# Patient Record
Sex: FEMALE | Race: BLACK OR AFRICAN AMERICAN | Employment: UNEMPLOYED | URBAN - METROPOLITAN AREA
[De-identification: names, ages, dates, MRNs, and addresses within clinical notes are randomized per-mention and may not be internally consistent; named-entity substitution may affect disease eponyms.]

---

## 2018-02-09 LAB
CHLAMYDIA, EXTERNAL: NEGATIVE
HBSAG, EXTERNAL: NEGATIVE
HEPATITIS C AB,   EXT: NEGATIVE
HIV, EXTERNAL: NEGATIVE
N. GONORRHEA, EXTERNAL: NEGATIVE
RPR, EXTERNAL: NON REACTIVE
RUBELLA, EXTERNAL: NORMAL

## 2018-03-07 ENCOUNTER — HOSPITAL ENCOUNTER (EMERGENCY)
Age: 23
Discharge: HOME | End: 2018-03-07
Attending: EMERGENCY MEDICINE
Payer: MEDICAID

## 2018-03-07 VITALS
OXYGEN SATURATION: 100 % | SYSTOLIC BLOOD PRESSURE: 108 MMHG | DIASTOLIC BLOOD PRESSURE: 70 MMHG | BODY MASS INDEX: 19.32 KG/M2 | WEIGHT: 105 LBS | TEMPERATURE: 98.5 F | RESPIRATION RATE: 16 BRPM | HEIGHT: 62 IN | HEART RATE: 68 BPM

## 2018-03-07 DIAGNOSIS — O20.0 THREATENED ABORTION: Primary | ICD-10-CM

## 2018-03-07 DIAGNOSIS — A56.09 CHLAMYDIA VAGINITIS/CERVICITIS: ICD-10-CM

## 2018-03-07 DIAGNOSIS — A56.02 CHLAMYDIA VAGINITIS/CERVICITIS: ICD-10-CM

## 2018-03-07 LAB
APPEARANCE UR: CLEAR
BACTERIA URNS QL MICRO: ABNORMAL /HPF
BASOPHILS # BLD: 0 K/UL (ref 0–0.06)
BASOPHILS NFR BLD: 0 % (ref 0–2)
BILIRUB UR QL: NEGATIVE
CAOX CRY URNS QL MICRO: ABNORMAL
COLOR UR: YELLOW
DIFFERENTIAL METHOD BLD: ABNORMAL
EOSINOPHIL # BLD: 0 K/UL (ref 0–0.4)
EOSINOPHIL NFR BLD: 0 % (ref 0–5)
EPITH CASTS URNS QL MICRO: ABNORMAL /LPF (ref 0–5)
ERYTHROCYTE [DISTWIDTH] IN BLOOD BY AUTOMATED COUNT: 15.3 % (ref 11.6–14.5)
GLUCOSE UR STRIP.AUTO-MCNC: NEGATIVE MG/DL
HCG SERPL-ACNC: ABNORMAL MIU/ML (ref 1–6)
HCT VFR BLD AUTO: 33.9 % (ref 35–45)
HGB BLD-MCNC: 10.7 G/DL (ref 12–16)
HGB UR QL STRIP: ABNORMAL
KETONES UR QL STRIP.AUTO: ABNORMAL MG/DL
LEUKOCYTE ESTERASE UR QL STRIP.AUTO: NEGATIVE
LYMPHOCYTES # BLD: 1.5 K/UL (ref 0.9–3.6)
LYMPHOCYTES NFR BLD: 21 % (ref 21–52)
MCH RBC QN AUTO: 23 PG (ref 24–34)
MCHC RBC AUTO-ENTMCNC: 31.6 G/DL (ref 31–37)
MCV RBC AUTO: 72.9 FL (ref 74–97)
MONOCYTES # BLD: 0.5 K/UL (ref 0.05–1.2)
MONOCYTES NFR BLD: 7 % (ref 3–10)
MUCOUS THREADS URNS QL MICRO: ABNORMAL /LPF
NEUTS SEG # BLD: 5.2 K/UL (ref 1.8–8)
NEUTS SEG NFR BLD: 72 % (ref 40–73)
NITRITE UR QL STRIP.AUTO: NEGATIVE
PH UR STRIP: 6.5 [PH] (ref 5–8)
PLATELET # BLD AUTO: 227 K/UL (ref 135–420)
PMV BLD AUTO: 10.2 FL (ref 9.2–11.8)
PROT UR STRIP-MCNC: NEGATIVE MG/DL
RBC # BLD AUTO: 4.65 M/UL (ref 4.2–5.3)
RBC #/AREA URNS HPF: ABNORMAL /HPF (ref 0–5)
SP GR UR REFRACTOMETRY: >1.03 (ref 1–1.03)
UROBILINOGEN UR QL STRIP.AUTO: 1 EU/DL (ref 0.2–1)
WBC # BLD AUTO: 7.2 K/UL (ref 4.6–13.2)
WBC URNS QL MICRO: NEGATIVE /HPF (ref 0–5)

## 2018-03-07 PROCEDURE — 85025 COMPLETE CBC W/AUTO DIFF WBC: CPT | Performed by: EMERGENCY MEDICINE

## 2018-03-07 PROCEDURE — 81001 URINALYSIS AUTO W/SCOPE: CPT | Performed by: EMERGENCY MEDICINE

## 2018-03-07 PROCEDURE — 74011250637 HC RX REV CODE- 250/637: Performed by: EMERGENCY MEDICINE

## 2018-03-07 PROCEDURE — 84702 CHORIONIC GONADOTROPIN TEST: CPT | Performed by: EMERGENCY MEDICINE

## 2018-03-07 PROCEDURE — 99284 EMERGENCY DEPT VISIT MOD MDM: CPT

## 2018-03-07 RX ORDER — AZITHROMYCIN 250 MG/1
1000 TABLET, FILM COATED ORAL
Status: COMPLETED | OUTPATIENT
Start: 2018-03-07 | End: 2018-03-07

## 2018-03-07 RX ORDER — ACETAMINOPHEN 500 MG
1000 TABLET ORAL
Status: COMPLETED | OUTPATIENT
Start: 2018-03-07 | End: 2018-03-07

## 2018-03-07 RX ADMIN — AZITHROMYCIN 1000 MG: 250 TABLET, FILM COATED ORAL at 20:24

## 2018-03-07 RX ADMIN — ACETAMINOPHEN 1000 MG: 500 TABLET ORAL at 20:24

## 2018-03-08 NOTE — DISCHARGE INSTRUCTIONS
Threatened Miscarriage: Care Instructions  Your Care Instructions    Some women have light spotting or bleeding during the first 12 weeks of pregnancy. In some cases this is normal. Light spotting or bleeding can also be a sign of a possible loss of the pregnancy. This is called a threatened miscarriage. At this point, the doctor may not be able to tell if your vaginal bleeding is normal or is a sign of a miscarriage. In early pregnancy, things such as stress, exercise, and sex do not cause miscarriage. You may be worried or upset about the possibility of losing your pregnancy. But do not blame yourself. There is no treatment to stop a threatened miscarriage. If you do have a miscarriage, there was nothing you could have done to prevent it. A miscarriage usually means that the pregnancy is not developing normally. The doctor has checked you carefully, but problems can develop later. If you notice any problems or new symptoms, get medical treatment right away. Follow-up care is a key part of your treatment and safety. Be sure to make and go to all appointments, and call your doctor if you are having problems. It's also a good idea to know your test results and keep a list of the medicines you take. How can you care for yourself at home? · If you do have a miscarriage, you will probably have some vaginal bleeding for 1 to 2 weeks. Use pads instead of tampons. · Take acetaminophen (Tylenol) for cramps. Read and follow all instructions on the label. · Do not take two or more pain medicines at the same time unless the doctor told you to. Many pain medicines have acetaminophen, which is Tylenol. Too much acetaminophen (Tylenol) can be harmful. · Do not have sex until your doctor says it is okay. · Get lots of rest over the next several days. · You may do your normal activities if you feel well enough to do them. But do not do any heavy exercise until your doctor says it is okay.   · Eat a balanced diet that is high in iron and vitamin C. Foods rich in iron include red meat, shellfish, eggs, beans, and leafy green vegetables. Foods high in vitamin C include citrus fruits, tomatoes, and broccoli. Talk to your doctor about whether you need to take iron pills or a multivitamin. · Do not drink alcohol or use tobacco or illegal drugs. · Do not smoke. If you need help quitting, talk to your doctor about stop-smoking programs and medicines. These can increase your chances of quitting for good. When should you call for help? Call 911 anytime you think you may need emergency care. For example, call if:  ? · You passed out (lost consciousness). ?Call your doctor now or seek immediate medical care if:  ? · You have severe vaginal bleeding. ? · You are dizzy or lightheaded, or you feel like you may faint. ? · You have new or worse pain in your belly or pelvis. ? · You have a fever. ? · You have vaginal discharge that smells bad. ? Watch closely for changes in your health, and be sure to contact your doctor if:  ? · You do not get better as expected. Where can you learn more? Go to http://yonis-joseline.info/. Enter A986 in the search box to learn more about \"Threatened Miscarriage: Care Instructions. \"  Current as of: March 16, 2017  Content Version: 11.4  © 1210-0877 [employer]. Care instructions adapted under license by [employer] (which disclaims liability or warranty for this information). If you have questions about a medical condition or this instruction, always ask your healthcare professional. [place] [address] any warranty or liability for your use of this information. Chlamydia: Care Instructions  Your Care Instructions  Chlamydia is a bacterial infection spread through sexual contact. It is one of the most common sexually transmitted infections (STIs).  Most people who get chlamydia do not have symptoms, but they can still infect their sex partners. If chlamydia in women is not treated, it can cause pelvic inflammatory disease (PID), a severe pelvic infection. PID can make it hard for a woman to get pregnant. Antibiotics can cure chlamydia. Both sex partners need treatment to keep from passing the infection back and forth. Certain antibiotics should not be used in pregnancy. If you were not tested for pregnancy during this visit, tell your doctor if you might be pregnant. Follow-up care is a key part of your treatment and safety. Be sure to make and go to all appointments, and call your doctor if you are having problems. It's also a good idea to know your test results and keep a list of the medicines you take. How can you care for yourself at home? · Chlamydia often is treated with a single dose of antibiotics in the doctor's office. If your doctor prescribed antibiotics to take at home, take them as directed. Do not stop taking them just because you feel better. You need to take the full course of antibiotics. · Do not have sex with anyone while you are being treated. If your treatment is a single dose of antibiotics, wait at least 7 days after you take the dose before you have sex. Even if you use a condom, you and your partner may pass the infection back and forth. · Make sure to tell your sex partner or partners that you have chlamydia. They should get treated, even if they do not have symptoms. · Your doctor may have done tests for other STIs. If so, call back in 3 or 4 days for those results. · Your doctor may advise you to be tested again for chlamydia in 3 or 4 months. How can you prevent chlamydia and other STIs in the future? · Use latex condoms every time you have sex. Use them from the beginning to the end of sexual contact. · Talk to your partner before you have sex. Find out if he or she has or is at risk for chlamydia or any other STI.  Keep in mind that a person may be able to spread an STI even if he or she does not have symptoms. · Do not have sex while you are being treated for chlamydia or any other STI. · Do not have sex with anyone who has symptoms of an STI, such as sores on the genitals or mouth. · Having one sex partner (who does not have STIs and does not have sex with anyone else) is a good way to avoid STIs. When should you call for help? Call 911 anytime you think you may need emergency care. For example, call if:  ? · You have sudden, severe pain in your belly or pelvis. ?Call your doctor now or seek immediate medical care if:  ? · You have new belly or pelvic pain. ? · You have a fever. ? · You have new or increased burning or pain with urination, or you cannot urinate. ? · You have pain, swelling, or tenderness in the scrotum. ? Watch closely for changes in your health, and be sure to contact your doctor if:  ? · You have unusual vaginal bleeding. ? · You have a discharge from the vagina or penis. ? · You think you may have been exposed to another STI. ? · Your symptoms get worse or have not improved within 1 week after starting treatment. ? · You have any new symptoms, such as sores, bumps, rashes, blisters, or warts in the genital or anal area. Where can you learn more? Go to http://yonis-joseline.info/. Enter P455 in the search box to learn more about \"Chlamydia: Care Instructions. \"  Current as of: March 20, 2017  Content Version: 11.4  © 8791-5047 [employer]. Care instructions adapted under license by [employer] (which disclaims liability or warranty for this information). If you have questions about a medical condition or this instruction, always ask your healthcare professional. [address] any warranty or liability for your use of this information.

## 2018-03-08 NOTE — ED PROVIDER NOTES
[address]  EMERGENCY DEPARTMENT HISTORY AND PHYSICAL EXAM    Date: 3/7/2018  Patient Name: Sherrell Meier  YOB: 1995  Medical Record Number: [id number]     History of Presenting Illness     Chief Complaint   Patient presents with    Abdominal Pain       History Provided By: Patient    Chief Complaint: Abdominal pain  Duration: 2 Days  Timing:  Constant  Location: Left sided abdomen  Quality: Cramping  Severity: 6 out of 10  Modifying Factors: No relieving or worsening factors  Associated Symptoms: Vaginal bleeding, back pain, and nausea    Additional History (Context):   8:02 PM  Sherrell Meier is a 23 y.o. female who is ~8 weeks pregnant, Eugenia Vasquez was 20, who presents to the emergency department via EMS C/O 6/10 left sided abdominal pain onset 2 days ago. Associated symptoms include vaginal bleeding, back pain, and nausea. Has not taken any medications for pain. Pt was seen by [facility] 2 weeks ago for abdominal pain with N/V, had US done and pelvic exam, and was dx with pregnancy at ~6 weeks. Not currently taking prenatals. Pt is A1, had  at 2 weeks 2 years ago. PMHx of chlamydia 1 year ago for which pt was tx'd, when retested 3 months ago, pt was negative for chlamydia. Pt denies vomiting, fever, chills, and any other Sx or complaints. Shx: -tobacco use, -EtOH use, -illicit drug use    PCP: Farzaneh Gilbert MD     Current Outpatient Prescriptions   Medication Sig Dispense Refill    PRENATAL VIT CALC,IRON,FOLIC (PRENATAL VITAMIN PO) Take  by mouth. Past History     Past Medical History:  Past Medical History:   Diagnosis Date         Chlamydia     Kidney infection        Past Surgical History:  No past surgical history on file.     Family History:  Family History   Problem Relation Age of Onset    Lupus Mother     Cancer Neg Hx        Social History:  Social History   Substance Use Topics    Smoking status: Never Smoker    Smokeless tobacco: Never Used    Alcohol use No       Allergies:  No Known Allergies      Review of Systems     Review of Systems   Constitutional: Negative for chills and fever. Gastrointestinal: Positive for abdominal pain and nausea. Negative for vomiting. Genitourinary: Positive for vaginal bleeding. Musculoskeletal: Positive for back pain. All other systems reviewed and are negative. Physical Exam     Vitals:    03/07/18 1926   BP: 133/63   Pulse: (!) 102   Resp: 16   Temp: 98.5 °F (36.9 °C)   SpO2: 100%   Weight: 47.6 kg (105 lb)   Height: 5' 2\" (1.575 m)     Physical Exam   Constitutional: She is oriented to person, place, and time. She appears well-developed and well-nourished. HENT:   Head: Normocephalic and atraumatic. Eyes: Pupils are equal, round, and reactive to light. Neck: Neck supple. Cardiovascular: Normal rate, regular rhythm, S1 normal, S2 normal and normal heart sounds. Pulmonary/Chest: Breath sounds normal. No respiratory distress. She has no wheezes. She has no rales. She exhibits no tenderness. Abdominal: Soft. She exhibits no distension and no mass. There is tenderness. There is no rebound and no guarding. Mild diffuse lower abdominal tenderness   Genitourinary: Rectal exam shows no mass. Cervix exhibits no discharge. Right adnexum displays no tenderness. Left adnexum displays no tenderness. No bleeding in the vagina. No vaginal discharge found. Genitourinary Comments: Small brownish discharge on fingertips during pelvic exam, but no discharge from cervix or in vagina   Musculoskeletal: Normal range of motion. She exhibits no edema or tenderness. Neurological: She is alert and oriented to person, place, and time. No cranial nerve deficit. Skin: No rash noted. Psychiatric: She has a normal mood and affect. Her behavior is normal. Thought content normal.   Nursing note and vitals reviewed.       Diagnostic Study Results     Labs -     Recent Results (from the past 12 hour(s))   CBC WITH AUTOMATED DIFF    Collection Time: 03/07/18  8:30 PM   Result Value Ref Range    WBC 7.2 4.6 - 13.2 K/uL    RBC 4.65 4.20 - 5.30 M/uL    HGB 10.7 (L) 12.0 - 16.0 g/dL    HCT 33.9 (L) 35.0 - 45.0 %    MCV 72.9 (L) 74.0 - 97.0 FL    MCH 23.0 (L) 24.0 - 34.0 PG    MCHC 31.6 31.0 - 37.0 g/dL    RDW 15.3 (H) 11.6 - 14.5 %    PLATELET 165 160 - 101 K/uL    MPV 10.2 9.2 - 11.8 FL    NEUTROPHILS 72 40 - 73 %    LYMPHOCYTES 21 21 - 52 %    MONOCYTES 7 3 - 10 %    EOSINOPHILS 0 0 - 5 %    BASOPHILS 0 0 - 2 %    ABS. NEUTROPHILS 5.2 1.8 - 8.0 K/UL    ABS. LYMPHOCYTES 1.5 0.9 - 3.6 K/UL    ABS. MONOCYTES 0.5 0.05 - 1.2 K/UL    ABS. EOSINOPHILS 0.0 0.0 - 0.4 K/UL    ABS. BASOPHILS 0.0 0.0 - 0.06 K/UL    DF AUTOMATED     BETA HCG, QT    Collection Time: 03/07/18  8:30 PM   Result Value Ref Range    Beta HCG, QT 804607 (H) 1.0 - 6.0 MIU/ML   URINALYSIS W/ RFLX MICROSCOPIC    Collection Time: 03/07/18  8:38 PM   Result Value Ref Range    Color YELLOW      Appearance CLEAR      Specific gravity >1.030 (H) 1.005 - 1.030    pH (UA) 6.5 5.0 - 8.0      Protein NEGATIVE  NEG mg/dL    Glucose NEGATIVE  NEG mg/dL    Ketone TRACE (A) NEG mg/dL    Bilirubin NEGATIVE  NEG      Blood TRACE (A) NEG      Urobilinogen 1.0 0.2 - 1.0 EU/dL    Nitrites NEGATIVE  NEG      Leukocyte Esterase NEGATIVE  NEG     URINE MICROSCOPIC ONLY    Collection Time: 03/07/18  8:38 PM   Result Value Ref Range    WBC NEGATIVE  0 - 5 /hpf    RBC 0 to 1 0 - 5 /hpf    Epithelial cells 1+ 0 - 5 /lpf    Bacteria 1+ (A) NEG /hpf    Mucus 1+ (A) NEG /lpf    CA Oxalate crystals FEW (A) NEG         Radiologic Studies -   No orders to display       Medications Given in the ED:  Medications   acetaminophen (TYLENOL) tablet 1,000 mg (1,000 mg Oral Given 3/7/18 2024)   azithromycin (ZITHROMAX) tablet 1,000 mg (1,000 mg Oral Given 3/7/18 2024)        Medical Decision Making     I am the first provider for this patient.     I reviewed the vital signs, available nursing notes, past medical history, past surgical history, family history and social history. Records Reviewed: Nursing Notes, Old Medical Records and Previous Radiology Studies   Pt was [facility] by [facility] on [facility]/21/18. Had quantitative of 45,432. Had US that showed yolk sac at ~6-7 weeks with no fetal pole and no heartbeat. She is Rh+. She was also positive for chlamydia. Vital Signs-Reviewed the patient's vital signs. Patient Vitals for the past 12 hrs:   Temp Pulse Resp BP SpO2   03/07/18 1926 98.5 °F (36.9 °C) (!) 102 16 133/63 100 %       Provider Notes (Medical Decision Making):   INITIAL CLINICAL IMPRESSION and PLANS:  The patient presents with the primary complaint(s) of: abdomen pain. The presentation, to include historical aspects and clinical findings are consistent with the DX of early pregnancy with vaginal bleeding. However, other possible DX's to consider as primary, associated with, or exacerbated by include:    1.  Ectopic  2. Threatened AB  3.  AB  4. UTI  5. Kidney stone  6. Ovarian dz  7. PID    Pulse Oximetry Analysis - Normal 100% on RA        Procedures:  Pelvic Exam  Date/Time: 3/7/2018 9:42 PM  Performed by: attending  Procedure duration:  15 minutes. Documented by:  Jane Arevalo, ED Scribe. As dictated by:  Zamzam Richey MD  Exam assisted by:  Female RN. Type of exam performed: bimanual and speculum. External genitalia appearance: normal.    Vaginal exam:  normal.    Cervical exam:  os closed (small brownish discharge on fingertips, but no discharge from cervix or in vagina). Specimen(s) collected:  chlamydia, GC and vaginal culture. Bimanual exam:  normal.    Patient tolerance: Patient tolerated the procedure well with no immediate complications  Comments: Uterus of 8-10 week size         ED Course:   8:02 PM Initial assessment performed. 9:40 PM Pt and/or family have been updated on their results.  Pt and/or pt's family are aware of the plan of care and are in agreement. Diagnosis and Disposition     Discussion:  23 y.o. female with c/o abdominal pain and vaginal bleeding. Reviewed [facility]. I suspect she most likely has chlamydia vaginitis and threatened AB. Her quantitative has almost quadrupled. She may need a repeat US as an outpatient to check for fetal heart beat. I tx'd her for her chlamydia and advised for her partner to be tx'd. She has OB already, but cannot recall who. Will refer her to [facility] doctor on call. Discharge Note:  9:48 PM  Coni Garg  results have been reviewed with her. She has been counseled regarding her diagnosis, treatment, and plan. She verbally conveys understanding and agreement of the signs, symptoms, diagnosis, treatment and prognosis and additionally agrees to follow up as discussed. She also agrees with the care-plan and conveys that all of her questions have been answered. I have also provided discharge instructions for her that include: educational information regarding their diagnosis and treatment, and list of reasons why they would want to return to the ED prior to their follow-up appointment, should her condition change. She has been provided with education for proper emergency department utilization. Clinical Impression:    1. Threatened     2. Chlamydia vaginitis/cervicitis        PLAN:  1. D/C Home  2. Current Discharge Medication List        3. Follow-up Information     Follow up With Details Comments Abran Hurt MD Schedule an appointment as soon as possible for a visit For follow up with [facility] [place] [address]      [facility] EMERGENCY DEPT Go to As needed, if symptoms worsen [address] [id number]  [phone]        _______________________________    Attestations:   This note is prepared by Anitra Louise, acting as Scribe for Ketty Yepez MD.    Ketty Yepez MD:  The scribe's documentation has been prepared under my direction and personally reviewed by me in its entirety.   I confirm that the note above accurately reflects all work, treatment, procedures, and medical decision making performed by me.  _______________________________

## 2018-03-08 NOTE — ED TRIAGE NOTES
C/o abd and back pain, nausea and bloody discharge with clots for 2 days. Pt states she is preg. No prenatal care. Sepsis Screening completed    (  )Patient meets SIRS criteria. ( x )Patient does not meet SIRS criteria.       SIRS Criteria is achieved when two or more of the following are present   Temperature < 96.8°F (36°C) or > 100.9°F (38.3°C)   Heart Rate > 90 beats per minute   Respiratory Rate > 20 breaths per minute   WBC count > 12,000 or <4,000 or > 10% bands

## 2018-03-12 NOTE — CALL BACK NOTE
Patient called and wanted clarification of her recent visit. Review of records shows patient was seen for vaginal bleeding and ultrasound done previously did not see a heartbeat and she was to have a re-pear ultrasound to check viability.  Patient has OB appt this Friday and is offering no questions or concerns at this time

## 2018-08-20 PROBLEM — A74.9 CHLAMYDIA INFECTION AFFECTING PREGNANCY IN FIRST TRIMESTER: Status: ACTIVE | Noted: 2018-08-20

## 2018-08-20 PROBLEM — O09.30 LATE PRENATAL CARE: Status: ACTIVE | Noted: 2018-08-20

## 2018-08-20 PROBLEM — O98.811 CHLAMYDIA INFECTION AFFECTING PREGNANCY IN FIRST TRIMESTER: Status: ACTIVE | Noted: 2018-08-20

## 2018-09-07 ENCOUNTER — HOSPITAL ENCOUNTER (EMERGENCY)
Age: 23
Discharge: HOME | DRG: 560 | End: 2018-09-08
Attending: OBSTETRICS & GYNECOLOGY | Admitting: OBSTETRICS & GYNECOLOGY
Payer: MEDICAID

## 2018-09-07 PROBLEM — O36.5931 IUGR (INTRAUTERINE GROWTH RESTRICTION) AFFECTING CARE OF MOTHER, THIRD TRIMESTER, FETUS 1: Status: ACTIVE | Noted: 2018-09-07

## 2018-09-07 PROBLEM — Z3A.35 35 WEEKS GESTATION OF PREGNANCY: Status: ACTIVE | Noted: 2018-09-07

## 2018-09-07 LAB
ABO + RH BLD: NORMAL
BLOOD GROUP ANTIBODIES SERPL: NORMAL
ERYTHROCYTE [DISTWIDTH] IN BLOOD BY AUTOMATED COUNT: 16.3 % (ref 11.6–14.5)
HCT VFR BLD AUTO: 29.4 % (ref 35–45)
HGB BLD-MCNC: 9.2 G/DL (ref 12–16)
MCH RBC QN AUTO: 22.9 PG (ref 24–34)
MCHC RBC AUTO-ENTMCNC: 31.3 G/DL (ref 31–37)
MCV RBC AUTO: 73.3 FL (ref 74–97)
PLATELET # BLD AUTO: 204 K/UL (ref 135–420)
PMV BLD AUTO: 10.2 FL (ref 9.2–11.8)
RBC # BLD AUTO: 4.01 M/UL (ref 4.2–5.3)
SPECIMEN EXP DATE BLD: NORMAL
WBC # BLD AUTO: 9.3 K/UL (ref 4.6–13.2)

## 2018-09-07 PROCEDURE — 85027 COMPLETE CBC AUTOMATED: CPT | Performed by: OBSTETRICS & GYNECOLOGY

## 2018-09-07 PROCEDURE — 86900 BLOOD TYPING SEROLOGIC ABO: CPT | Performed by: OBSTETRICS & GYNECOLOGY

## 2018-09-07 PROCEDURE — 74011250636 HC RX REV CODE- 250/636: Performed by: OBSTETRICS & GYNECOLOGY

## 2018-09-07 RX ORDER — SODIUM CHLORIDE, SODIUM LACTATE, POTASSIUM CHLORIDE, CALCIUM CHLORIDE 600; 310; 30; 20 MG/100ML; MG/100ML; MG/100ML; MG/100ML
125 INJECTION, SOLUTION INTRAVENOUS CONTINUOUS
Status: DISCONTINUED | OUTPATIENT
Start: 2018-09-07 | End: 2018-09-08 | Stop reason: HOSPADM

## 2018-09-07 RX ADMIN — SODIUM CHLORIDE, SODIUM LACTATE, POTASSIUM CHLORIDE, AND CALCIUM CHLORIDE 125 ML/HR: 600; 310; 30; 20 INJECTION, SOLUTION INTRAVENOUS at 18:28

## 2018-09-07 RX ADMIN — SODIUM CHLORIDE, SODIUM LACTATE, POTASSIUM CHLORIDE, AND CALCIUM CHLORIDE 1000 ML: 600; 310; 30; 20 INJECTION, SOLUTION INTRAVENOUS at 16:58

## 2018-09-07 NOTE — PROGRESS NOTES
1625 Patient arrives on unit from Dr Hector Sawyer office ( drove her here). Patient was seen for ultrasound today in which baby was found to be SGA. An NST was performed afterward and after several hours of non-reactivity and patient eating Lisa Ag was still not reactive. Patient is currently 35w0d a .    1705 Patient makes a statement to this RN while placing IV that she \"just doesn't feel right\" and \"feels weak. \"  9019 Dr Seth Cevallos at patient bedside for assessment of patient. MD reviews Counts include 234 beds at the [facility] strip. MD orders patient to receive continuous fluids and regular diet. MD states to notify him of any further late decelerations and he will order steroids to mature baby's lungs and head toward delivery.  Patient transferred to [facility] for observation. Patient orders food. 191 Bedside and verbal report given to TEA Graves RN. Care of patient relinquished at this time.

## 2018-09-07 NOTE — H&P
Ostetrical History and Physical    Subjective:     Date of Admission: 2018    Patient is a 23 y.o.   female admitted with Non reactive NST with decels (mild x 2 sinhce arrival). 35 wks. IUGR, normal dopplers. For Obstetric history, see prenantal.    For Review of Systems, see prenatal    Past Medical History:   Diagnosis Date         Chlamydia     Kidney infection       No past surgical history on file. Prior to Admission medications    Medication Sig Start Date End Date Taking? Authorizing Provider   ferrous sulfate 325 mg (65 mg iron) tablet Take 1 Tab by mouth. 18  Yes Historical Provider   PRENATAL VIT CALC,IRON,FOLIC (PRENATAL VITAMIN PO) Take  by mouth. Yes Phys Other, MD   nitrofurantoin, macrocrystal-monohydrate, (MACROBID) 100 mg capsule Take 1 Cap by mouth two (2) times a day. Indications: BACTERIAL URINARY TRACT INFECTION 18   Sruthi Fruits Tim, NP   terconazole (TERAZOL 7) 0.4 % vaginal cream Insert 1 Applicator into vagina nightly. 18   Sruthi Fruits Tim, NP     No Known Allergies   Social History   Substance Use Topics    Smoking status: Never Smoker    Smokeless tobacco: Never Used    Alcohol use No      Family History   Problem Relation Age of Onset    Lupus Mother     Cancer Neg Hx           Objective:     Height 5' 2\" (1.575 m), weight 64.4 kg (142 lb), last menstrual period 2018. No data recorded. HEENT: No pallor, no jaundice, Thyroid and throat normal  RESPIRATORY: Clear to A & P  CVS: pulse reg, HS normal  ABDOMEN: Gravid. Vertex. EFW=4lb +-1lb. No abnormal tenderness. Pelvic: waiting for exam-  Data Review:   No results found for this or any previous visit (from the past 24 hour(s)).   Monitor:  Reactivity:present Variability:present Baseline:within normal limits    Assessment:     Principal Problem:    Abnormal fetal heart beat noted before labor in liveborn infant (2018)    Active Problems:    IUGR (intrauterine growth restriction) affecting care of mother, third trimester, fetus 1 (9/7/2018)      35 weeks gestation of pregnancy (9/7/2018)      Late prenatal care (8/20/2018)      Overview: Initiated prenatal care with [facility] at 25 weeks gestation         Plan:   MILAN with bolus  Monitor  Feed  If further decels, forsteroids. ;    Check labs:  CBC  Check  Prenatal:    Disposition:     Type of admit:23 Hour    I saw and examined patient.     Signed By: Nell More MD                         September 7, 2018

## 2018-09-07 NOTE — IP AVS SNAPSHOT
[address] [place] [address] 
[phone] Patient: Samantha Lechuga MRN: [id number] :1995 About your hospitalization You were admitted on:  N/A You last received care in the:  [facility] You were discharged on:  2018 Why you were hospitalized Your primary diagnosis was: Abnormal Fetal Heart Beat Noted Before Labor In Liveborn Infant Your diagnoses also included:  Late Prenatal Care, Iugr (Intrauterine Growth Restriction) Affecting Care Of Mother, Third Trimester, Fetus 1, 28 Weeks Gestation Of Pregnancy Follow-up Information Follow up With Details Comments Contact Info Armen Ramos MD   [address] [place] [phone] 
[phone] Discharge Orders None A check reena indicates which time of day the medication should be taken. My Medications CONTINUE taking these medications Instructions Each Dose to Equal  
 Morning Noon Evening Bedtime  
 ferrous sulfate 325 mg (65 mg iron) tablet Your last dose was: Your next dose is: Take 1 Tab by mouth. 1 Tab  
    
   
   
   
  
 nitrofurantoin (macrocrystal-monohydrate) 100 mg capsule Commonly known as:  MACROBID Your last dose was: Your next dose is: Take 1 Cap by mouth two (2) times a day. Indications: BACTERIAL URINARY TRACT INFECTION  
 100 mg PRENATAL VITAMIN PO Your last dose was: Your next dose is: Take  by mouth. terconazole 0.4 % vaginal cream  
Commonly known as:  TERAZOL 7 Your last dose was: Your next dose is: Insert 1 Applicator into vagina nightly. 1 Applicator Discharge Instructions Counting Your Baby's Kicks: Care Instructions Your Care Instructions Counting your baby's kicks is one way your doctor can tell that your baby is healthy. Most women-especially in a first pregnancy-feel their baby move for the first time between 16 and 22 weeks. The movement may feel like flutters rather than kicks. Your baby may move more at certain times of the day. When you are active, you may notice less kicking than when you are resting. At your prenatal visits, your doctor will ask whether the baby is active. In your last trimester, your doctor may ask you to count the number of times you feel your baby move. Follow-up care is a key part of your treatment and safety. Be sure to make and go to all appointments, and call your doctor if you are having problems. It's also a good idea to know your test results and keep a list of the medicines you take. How do you count fetal kicks? · A common method of checking your baby's movement is to count the number of kicks or moves you feel in 1 hour. Ten movements (such as kicks, flutters, or rolls) in 1 hour are normal. Some doctors suggest that you count in the morning until you get to 10 movements. Then you can quit for that day and start again the next day. · Pick your baby's most active time of day to count. This may be any time from morning to evening. · If you do not feel 10 movements in an hour, your baby may be sleeping. Wait for the next hour and count again. When should you call for help? Call your doctor now or seek immediate medical care if: 
  · You noticed that your baby has stopped moving or is moving much less than normal.  
 Watch closely for changes in your health, and be sure to contact your doctor if you have any problems. Where can you learn more? Go to http://yonis-joseline.info/. Enter J320 in the search box to learn more about \"Counting Your Baby's Kicks: Care Instructions. \" Current as of: November 21, 2017 Content Version: 11.7 © 6060-7023 [employer]. Care instructions adapted under license by [employer] (which disclaims liability or warranty for this information). If you have questions about a medical condition or this instruction, always ask your healthcare professional. [address] any warranty or liability for your use of this information. Pregnancy Precautions: Care Instructions Your Care Instructions There is no sure way to prevent labor before your due date ( labor) or to prevent most other pregnancy problems. But there are things you can do to increase your chances of a healthy pregnancy. Go to your appointments, follow your doctor's advice, and take good care of yourself. Eat well, and exercise (if your doctor agrees). And make sure to drink plenty of water. Follow-up care is a key part of your treatment and safety. Be sure to make and go to all appointments, and call your doctor if you are having problems. It's also a good idea to know your test results and keep a list of the medicines you take. How can you care for yourself at home? · Make sure you go to your prenatal appointments. At each visit, your doctor will check your blood pressure. Your doctor will also check to see if you have protein in your urine. High blood pressure and protein in urine are signs of preeclampsia. This condition can be dangerous for you and your baby. · Drink plenty of fluids, enough so that your urine is light yellow or clear like water. Dehydration can cause contractions. If you have kidney, heart, or liver disease and have to limit fluids, talk with your doctor before you increase the amount of fluids you drink. · Tell your doctor right away if you notice any symptoms of an infection, such as: ¨ Burning when you urinate. ¨ A foul-smelling discharge from your vagina. ¨ Vaginal itching. ¨ Unexplained fever. ¨ Unusual pain or soreness in your uterus or lower belly. · Eat a balanced diet. Include plenty of foods that are high in calcium and iron. ¨ Foods high in calcium include milk, cheese, yogurt, almonds, and broccoli. ¨ Foods high in iron include red meat, shellfish, poultry, eggs, beans, raisins, whole-grain bread, and leafy green vegetables. · Do not smoke. If you need help quitting, talk to your doctor about stop-smoking programs and medicines. These can increase your chances of quitting for good. · Do not drink alcohol or use illegal drugs. · Follow your doctor's directions about activity. Your doctor will let you know how much, if any, exercise you can do. · Ask your doctor if you can have sex. If you are at risk for early labor, your doctor may ask you to not have sex. · Take care to prevent falls. During pregnancy, your joints are loose, and your balance is off. Sports such as bicycling, skiing, or in-line skating can increase your risk of falling. And don't ride horses or motorcycles, dive, water ski, scuba dive, or parachute jump while you are pregnant. · Avoid getting very hot. Do not use saunas or hot tubs. Avoid staying out in the sun in hot weather for long periods. Take acetaminophen (Tylenol) to lower a high fever. · Do not take any over-the-counter or herbal medicines or supplements without talking to your doctor or pharmacist first. 
When should you call for help? Call 911 anytime you think you may need emergency care. For example, call if: 
  · You passed out (lost consciousness).  
  · You have severe vaginal bleeding.  
  · You have severe pain in your belly or pelvis.  
  · You have had fluid gushing or leaking from your vagina and you know or think the umbilical cord is bulging into your vagina. If this happens, immediately get down on your knees so your rear end (buttocks) is higher than your head. This will decrease the pressure on the cord until help arrives.  
[facility] your doctor now or seek immediate medical care if:   · You have signs of preeclampsia, such as: 
¨ Sudden swelling of your face, hands, or feet. ¨ New vision problems (such as dimness or blurring). ¨ A severe headache.  
  · You have any vaginal bleeding.  
  · You have belly pain or cramping.  
  · You have a fever.  
  · You have had regular contractions (with or without pain) for an hour. This means that you have 8 or more within 1 hour or 4 or more in 20 minutes after you change your position and drink fluids.  
  · You have a sudden release of fluid from your vagina.  
  · You have low back pain or pelvic pressure that does not go away.  
  · You notice that your baby has stopped moving or is moving much less than normal.  
 Watch closely for changes in your health, and be sure to contact your doctor if you have any problems. Where can you learn more? Go to http://yonis-joseline.info/. Enter [id number] in the search box to learn more about \"Pregnancy Precautions: Care Instructions. \" Current as of: November 21, 2017 Content Version: 11.7 © 0783-2368 First To File. Care instructions adapted under license by [employer] (which disclaims liability or warranty for this information). If you have questions about a medical condition or this instruction, always ask your healthcare professional. [address] any warranty or liability for your use of this information. Make an appointment with Dr. Linda Espinosa office for tomorrow. Introducing [facility]! Ryan Meléndez introduces Paixie.net patient portal. Now you can access parts of your medical record, email your doctor's office, and request medication refills online. 1. In your internet browser, go to [email or web address] 2. Click on the First Time User? Click Here link in the Sign In box. You will see the New Member Sign Up page. 3. Enter your Paixie.net Access Code exactly as it appears below.  You will not need to use this code after youve completed the sign-up process. If you do not sign up before the expiration date, you must request a new code. · [email or web address] Access Code: [email or web address] Expires: 11/18/2018  4:20 PM 
 
4. Enter the last four digits of your Social Security Number (xxxx) and Date of Birth (mm/dd/yyyy) as indicated and click Submit. You will be taken to the next sign-up page. 5. Create a hive01 ID. This will be your hive01 login ID and cannot be changed, so think of one that is secure and easy to remember. 6. Create a hive01 password. You can change your password at any time. 7. Enter your Password Reset Question and Answer. This can be used at a later time if you forget your password. 8. Enter your e-mail address. You will receive e-mail notification when new information is available in [address] 9. Click Sign Up. You can now view and download portions of your medical record. 10. Click the Download Summary menu link to download a portable copy of your medical information. If you have questions, please visit the Frequently Asked Questions section of the hive01 website. Remember, hive01 is NOT to be used for urgent needs. For medical emergencies, dial 911. Now available from your iPhone and Android! Introducing Hector Rodriguez As a [employer] patient, I wanted to make you aware of our electronic visit tool called Hector Michael. [employer] 24/7 allows you to connect within minutes with a medical provider 24 hours a day, seven days a week via a mobile device or tablet or logging into a secure website from your computer. You can access Hector Rodriguez from anywhere in the [place].  
 
A virtual visit might be right for you when you have a simple condition and feel like you just dont want to get out of bed, or cant get away from work for an appointment, when your regular [employer] provider is not available (evenings, weekends or holidays), or when youre out of town and need minor care. Electronic visits cost only $49 and if the [employer] 24/7 provider determines a prescription is needed to treat your condition, one can be electronically transmitted to a nearby pharmacy*. Please take a moment to enroll today if you have not already done so. The enrollment process is free and takes just a few minutes. To enroll, please download the [employer] 24/7 miguelito to your tablet or phone, or visit [email or web address] to enroll on your computer. And, as an [address] [place] [address] patient with a [employer] account, the results of your visits will be scanned into your electronic medical record and your primary care provider will be able to view the scanned results. We urge you to continue to see your regular [employer] provider for your ongoing medical care. And while your primary care provider may not be the one available when you seek a [employer] virtual visit, the peace of mind you get from getting a real diagnosis real time can be priceless. For more information on [employer], view our Frequently Asked Questions (FAQs) at [email or web address]. Sincerely, 
 
Ciro Licea MD 
Chief Medical Officer [address] *:  certain medications cannot be prescribed via [employer] Providers Seen During Your Hospitalization Provider Specialty Primary office phone Francy Scott MD Obstetrics & Gynecology [phone] Your Primary Care Physician (PCP) Primary Care Physician Office Phone Office Fax Shaq Boyer [phone] You are allergic to the following No active allergies Recent Documentation Height Weight BMI OB Status Smoking Status 1.575 m 64.4 kg 25.97 kg/m2 Pregnant Never Smoker Emergency Contacts Name Discharge Info Relation Home Work Mobile 73 Kera Ledezma CAREGIVER [3] Other Relative [6] [phone] Patient Belongings The following personal items are in your possession at time of discharge: 
                             
 
  
  
 Please provide this summary of care documentation to your next provider. Signatures-by signing, you are acknowledging that this After Visit Summary has been reviewed with you and you have received a copy. Patient Signature:  ____________________________________________________________ Date:  ____________________________________________________________  
  
[facility] Provider Signature:  ____________________________________________________________ Date:  ____________________________________________________________

## 2018-09-07 NOTE — PROGRESS NOTES
1910- Bedside shift change report given to Jose D Johansen RN (oncoming nurse) by Indra Fischer. Long Downey RN (offgoing nurse). Report included the following information SBAR, Kardex, Intake/Output, MAR and Recent Results. 1925- Patient ambulated to restroom. Sitting high schneider's eating dinner tray. Mother at bedside. 2020- Patient given heating pad for back pain. US adjusted. 2135- Patient back to bed. Lying left lateral. Provided blanket. Lights dimmed per patient request.    0279- US adjusted. Patient resting comfortably. 2306- Patient back from bathroom. Patient resting comfortably. Lights dimmed for comfort. 9214- Patient lying right side. US adjusted. 130- Patient sleeping.    235- Patient given lunch and ginger ale. Water cup refilled with ice and water per patient request. Patient sitting up eating. 345- US adjusted; patient resting. Lights off for patient comfort. 430- Patient sleeping. 530- Patient sleeping. 620- Patient's vitals updated. 635- Patient sitting up US reading maternal heart rate. 657- Patient sitting up. US adjusted. US was reading maternal heart rate. 720- Bedside shift change report given to SILVANO Baxter RN (oncoming nurse) by Jose D Johansen RN   (offgoing nurse). Report included the following information SBAR, Kardex, Intake/Output, MAR and Recent Results.

## 2018-09-08 VITALS
DIASTOLIC BLOOD PRESSURE: 73 MMHG | RESPIRATION RATE: 18 BRPM | OXYGEN SATURATION: 100 % | WEIGHT: 142 LBS | TEMPERATURE: 98.7 F | HEIGHT: 62 IN | BODY MASS INDEX: 26.13 KG/M2 | HEART RATE: 87 BPM | SYSTOLIC BLOOD PRESSURE: 138 MMHG

## 2018-09-08 PROCEDURE — 74011250636 HC RX REV CODE- 250/636: Performed by: OBSTETRICS & GYNECOLOGY

## 2018-09-08 PROCEDURE — 59020 FETAL CONTRACT STRESS TEST: CPT

## 2018-09-08 PROCEDURE — 76815 OB US LIMITED FETUS(S): CPT

## 2018-09-08 PROCEDURE — 99285 EMERGENCY DEPT VISIT HI MDM: CPT

## 2018-09-08 RX ORDER — OXYTOCIN/0.9 % SODIUM CHLORIDE 30/500 ML
0-20 PLASTIC BAG, INJECTION (ML) INTRAVENOUS
Status: DISCONTINUED | OUTPATIENT
Start: 2018-09-08 | End: 2018-09-08 | Stop reason: HOSPADM

## 2018-09-08 RX ADMIN — OXYTOCIN 2 MILLI-UNITS/MIN: 10 INJECTION, SOLUTION INTRAMUSCULAR; INTRAVENOUS at 11:22

## 2018-09-08 RX ADMIN — SODIUM CHLORIDE, SODIUM LACTATE, POTASSIUM CHLORIDE, AND CALCIUM CHLORIDE 125 ML/HR: 600; 310; 30; 20 INJECTION, SOLUTION INTRAVENOUS at 02:28

## 2018-09-08 RX ADMIN — SODIUM CHLORIDE, SODIUM LACTATE, POTASSIUM CHLORIDE, AND CALCIUM CHLORIDE 125 ML/HR: 600; 310; 30; 20 INJECTION, SOLUTION INTRAVENOUS at 11:20

## 2018-09-08 NOTE — PROGRESS NOTES
Not much progree with cytotec overnight, but has finally started painful contractions this am 
 
Monitor:  Reactivity:present Variability:present Baseline:within normal limits  Contractions q 3 Vitals:  Blood pressure 109/58, pulse 94, temperature 98.4 °F (36.9 °C), resp. rate 18, height 5' 2\" (1.575 m), weight 64.4 kg (142 lb), last menstrual period 01/05/2018. Pelvic exam:  Cervix 3, by WILIAN chandler am 
  
Plan:  
 
Continue pit, epidural if needed Signed By: Sailaja Montgomery MD  
                      September 8, 2018

## 2018-09-08 NOTE — PROGRESS NOTES
0720 Bedside and Verbal shift change report given to [facility], RN (oncoming nurse) by Charla Lancaster. WILIAN Graves (offgoing nurse). Report included the following information SBAR, Kardex, Intake/Output, MAR and Recent Results. Patient resting in room. Call bell within reach. No further needs at this time. Will continue to monitor. 5627 Dr. Cody Flores at bedside to perform an ultrasound and discuss plan of care. 1030 Reviewed strip with Dr. Cody Flores. Received order for Toyin Winter.    Cory Flores at bedside. Bedside ultasound to check placement of placenta    1225 Orders received to stop oxytocin challenge test and fluid. EFM and TOCO removed. 1330 Discharge education reviewed and packet given to patient. Patient verbalized understanding of discharge instructions and was given the opportunity to ask questions. E-sign completed. Armbands removed and given to patient. No further needs reported at this time. 1335 Patient ambulated off unit with family.

## 2018-09-08 NOTE — DISCHARGE INSTRUCTIONS
Counting Your Baby's Kicks: Care Instructions  Your Care Instructions    Counting your baby's kicks is one way your doctor can tell that your baby is healthy. Most women-especially in a first pregnancy-feel their baby move for the first time between 16 and 22 weeks. The movement may feel like flutters rather than kicks. Your baby may move more at certain times of the day. When you are active, you may notice less kicking than when you are resting. At your prenatal visits, your doctor will ask whether the baby is active. In your last trimester, your doctor may ask you to count the number of times you feel your baby move. Follow-up care is a key part of your treatment and safety. Be sure to make and go to all appointments, and call your doctor if you are having problems. It's also a good idea to know your test results and keep a list of the medicines you take. How do you count fetal kicks? · A common method of checking your baby's movement is to count the number of kicks or moves you feel in 1 hour. Ten movements (such as kicks, flutters, or rolls) in 1 hour are normal. Some doctors suggest that you count in the morning until you get to 10 movements. Then you can quit for that day and start again the next day. · Pick your baby's most active time of day to count. This may be any time from morning to evening. · If you do not feel 10 movements in an hour, your baby may be sleeping. Wait for the next hour and count again. When should you call for help? Call your doctor now or seek immediate medical care if:    · You noticed that your baby has stopped moving or is moving much less than normal.    Watch closely for changes in your health, and be sure to contact your doctor if you have any problems. Where can you learn more? Go to http://yonis-joseline.info/. Enter S198 in the search box to learn more about \"Counting Your Baby's Kicks: Care Instructions. \"  Current as of: November 21, 2017  Content Version: 11.7  © 2351-1150 [employer]. Care instructions adapted under license by [employer] (which disclaims liability or warranty for this information). If you have questions about a medical condition or this instruction, always ask your healthcare professional. [address] any warranty or liability for your use of this information. Pregnancy Precautions: Care Instructions  Your Care Instructions    There is no sure way to prevent labor before your due date ( labor) or to prevent most other pregnancy problems. But there are things you can do to increase your chances of a healthy pregnancy. Go to your appointments, follow your doctor's advice, and take good care of yourself. Eat well, and exercise (if your doctor agrees). And make sure to drink plenty of water. Follow-up care is a key part of your treatment and safety. Be sure to make and go to all appointments, and call your doctor if you are having problems. It's also a good idea to know your test results and keep a list of the medicines you take. How can you care for yourself at home? · Make sure you go to your prenatal appointments. At each visit, your doctor will check your blood pressure. Your doctor will also check to see if you have protein in your urine. High blood pressure and protein in urine are signs of preeclampsia. This condition can be dangerous for you and your baby. · Drink plenty of fluids, enough so that your urine is light yellow or clear like water. Dehydration can cause contractions. If you have kidney, heart, or liver disease and have to limit fluids, talk with your doctor before you increase the amount of fluids you drink. · Tell your doctor right away if you notice any symptoms of an infection, such as:  ¨ Burning when you urinate. ¨ A foul-smelling discharge from your vagina. ¨ Vaginal itching. ¨ Unexplained fever.   ¨ Unusual pain or soreness in your uterus or lower belly. · Eat a balanced diet. Include plenty of foods that are high in calcium and iron. ¨ Foods high in calcium include milk, cheese, yogurt, almonds, and broccoli. ¨ Foods high in iron include red meat, shellfish, poultry, eggs, beans, raisins, whole-grain bread, and leafy green vegetables. · Do not smoke. If you need help quitting, talk to your doctor about stop-smoking programs and medicines. These can increase your chances of quitting for good. · Do not drink alcohol or use illegal drugs. · Follow your doctor's directions about activity. Your doctor will let you know how much, if any, exercise you can do. · Ask your doctor if you can have sex. If you are at risk for early labor, your doctor may ask you to not have sex. · Take care to prevent falls. During pregnancy, your joints are loose, and your balance is off. Sports such as bicycling, skiing, or in-line skating can increase your risk of falling. And don't ride horses or motorcycles, dive, water ski, scuba dive, or parachute jump while you are pregnant. · Avoid getting very hot. Do not use saunas or hot tubs. Avoid staying out in the sun in hot weather for long periods. Take acetaminophen (Tylenol) to lower a high fever. · Do not take any over-the-counter or herbal medicines or supplements without talking to your doctor or pharmacist first.  When should you call for help? Call 911 anytime you think you may need emergency care. For example, call if:    · You passed out (lost consciousness).     · You have severe vaginal bleeding.     · You have severe pain in your belly or pelvis.     · You have had fluid gushing or leaking from your vagina and you know or think the umbilical cord is bulging into your vagina. If this happens, immediately get down on your knees so your rear end (buttocks) is higher than your head.  This will decrease the pressure on the cord until help arrives.   [facility] your doctor now or seek immediate medical care if:    · You have signs of preeclampsia, such as:  ¨ Sudden swelling of your face, hands, or feet. ¨ New vision problems (such as dimness or blurring). ¨ A severe headache.     · You have any vaginal bleeding.     · You have belly pain or cramping.     · You have a fever.     · You have had regular contractions (with or without pain) for an hour. This means that you have 8 or more within 1 hour or 4 or more in 20 minutes after you change your position and drink fluids.     · You have a sudden release of fluid from your vagina.     · You have low back pain or pelvic pressure that does not go away.     · You notice that your baby has stopped moving or is moving much less than normal.    Watch closely for changes in your health, and be sure to contact your doctor if you have any problems. Where can you learn more? Go to http://yonis-joseline.info/. Enter [id number] in the search box to learn more about \"Pregnancy Precautions: Care Instructions. \"  Current as of: November 21, 2017  Content Version: 11.7  © 4901-9125 [employer]. Care instructions adapted under license by [employer] (which disclaims liability or warranty for this information). If you have questions about a medical condition or this instruction, always ask your healthcare professional. [facility] [address] any warranty or liability for your use of this information. Make an appointment with Dr. Santos Clara office for tomorrow.

## 2018-09-08 NOTE — PROGRESS NOTES
Normal FHTs vernight with some patches of reactivity.; Good variability    Monitor:  Reactivity:present Variability:present Baseline:within normal limits  Contractions q none of note    Vitals:  Blood pressure 109/58, pulse 94, temperature 98.4 °F (36.9 °C), resp. rate 18, height 5' 2\" (1.575 m), weight 64.4 kg (142 lb), last menstrual period 01/05/2018.      Pelvic exam:  deferred    Plan:     JACKELINE=16.6, LDP=5.1  Home and see  [facility] Monday am    Signed By: Sally Paez MD                         September 8, 2018

## 2018-09-08 NOTE — PROGRESS NOTES
OOB to BR to void and back. Pt uncomfortable lying in bed, so she wants to get OOB on birthing ball.

## 2018-09-09 ENCOUNTER — HOSPITAL ENCOUNTER (INPATIENT)
Age: 23
LOS: 5 days | Discharge: HOME | DRG: 560 | End: 2018-09-15
Attending: OBSTETRICS & GYNECOLOGY | Admitting: OBSTETRICS & GYNECOLOGY
Payer: MEDICAID

## 2018-09-10 PROBLEM — Z33.1 IUP (INTRAUTERINE PREGNANCY), INCIDENTAL: Status: ACTIVE | Noted: 2018-09-10

## 2018-09-10 PROBLEM — Z3A.35 PREGNANCY WITH 35 COMPLETED WEEKS GESTATION: Status: ACTIVE | Noted: 2018-09-10

## 2018-09-10 PROBLEM — O99.013 ANEMIA DURING PREGNANCY IN THIRD TRIMESTER: Status: ACTIVE | Noted: 2018-09-10

## 2018-09-10 PROCEDURE — 81003 URINALYSIS AUTO W/O SCOPE: CPT

## 2018-09-10 PROCEDURE — 65270000029 HC RM PRIVATE

## 2018-09-10 PROCEDURE — 74011250636 HC RX REV CODE- 250/636: Performed by: OBSTETRICS & GYNECOLOGY

## 2018-09-10 PROCEDURE — 77030032490 HC SLV COMPR SCD KNE COVD -B

## 2018-09-10 RX ORDER — BETAMETHASONE SODIUM PHOSPHATE AND BETAMETHASONE ACETATE 3; 3 MG/ML; MG/ML
12 INJECTION, SUSPENSION INTRA-ARTICULAR; INTRALESIONAL; INTRAMUSCULAR; SOFT TISSUE EVERY 24 HOURS
Status: DISCONTINUED | OUTPATIENT
Start: 2018-09-10 | End: 2018-09-12

## 2018-09-10 RX ORDER — OXYTOCIN/0.9 % SODIUM CHLORIDE 30/500 ML
0-25 PLASTIC BAG, INJECTION (ML) INTRAVENOUS
Status: DISCONTINUED | OUTPATIENT
Start: 2018-09-10 | End: 2018-09-12

## 2018-09-10 RX ORDER — SODIUM CHLORIDE, SODIUM LACTATE, POTASSIUM CHLORIDE, CALCIUM CHLORIDE 600; 310; 30; 20 MG/100ML; MG/100ML; MG/100ML; MG/100ML
125 INJECTION, SOLUTION INTRAVENOUS CONTINUOUS
Status: DISCONTINUED | OUTPATIENT
Start: 2018-09-10 | End: 2018-09-12

## 2018-09-10 RX ADMIN — SODIUM CHLORIDE, SODIUM LACTATE, POTASSIUM CHLORIDE, AND CALCIUM CHLORIDE 500 ML: 600; 310; 30; 20 INJECTION, SOLUTION INTRAVENOUS at 01:39

## 2018-09-10 RX ADMIN — OXYTOCIN 4 MILLI-UNITS/MIN: 10 INJECTION, SOLUTION INTRAMUSCULAR; INTRAVENOUS at 10:33

## 2018-09-10 RX ADMIN — SODIUM CHLORIDE, SODIUM LACTATE, POTASSIUM CHLORIDE, AND CALCIUM CHLORIDE 125 ML/HR: 600; 310; 30; 20 INJECTION, SOLUTION INTRAVENOUS at 01:51

## 2018-09-10 RX ADMIN — OXYTOCIN 2 MILLI-UNITS/MIN: 10 INJECTION, SOLUTION INTRAMUSCULAR; INTRAVENOUS at 09:52

## 2018-09-10 RX ADMIN — BETAMETHASONE SODIUM PHOSPHATE AND BETAMETHASONE ACETATE 12 MG: 3; 3 INJECTION, SUSPENSION INTRA-ARTICULAR; INTRALESIONAL; INTRAMUSCULAR at 01:44

## 2018-09-10 NOTE — H&P
History & Physical    Name: Rama Parkinson MRN: [id number]  SSN: [id number]    YOB: 1995  Age: 23 y.o. Sex: female      Subjective:     Reason for Admission:  Pregnancy and leakage of fluid    History of Present Illness: Ms. Jocy Orozco is a 23 y.o.  female with an estimated gestational age of 30w2d with Estimated Date of Delivery: 10/12/18. Patient complains of mild vaginal leaking of fluid for 1 day. Nitrazine was negative. Pregnancy has been complicated by fetal growth restriction and dilation of portion umbilical vein, anemia. Patient denies headache , vaginal bleeding  and vaginal leaking of fluid  today. OB History    Para Term  AB Living   2 0 0 0 1 0   SAB TAB Ectopic Molar Multiple Live Births   0 0 0  0       # Outcome Date GA Lbr Apolinar/2nd Weight Sex Delivery Anes PTL Lv   2 Current            1 AB                 Past Medical History:   Diagnosis Date         Anemia     Chlamydia     Kidney infection      No past surgical history on file. Social History     Occupational History    Not on file. Social History Main Topics    Smoking status: Never Smoker    Smokeless tobacco: Never Used    Alcohol use No    Drug use: No    Sexual activity: Not Currently     Partners: Male     Birth control/ protection: None      Family History   Problem Relation Age of Onset    Lupus Mother     Cancer Neg Hx        No Known Allergies  Prior to Admission medications    Medication Sig Start Date End Date Taking? Authorizing Provider   ferrous sulfate 325 mg (65 mg iron) tablet Take 1 Tab by mouth. 18   Historical Provider   nitrofurantoin, macrocrystal-monohydrate, (MACROBID) 100 mg capsule Take 1 Cap by mouth two (2) times a day. Indications: BACTERIAL URINARY TRACT INFECTION 18   Radhavibertha [facility], NP   terconazole (TERAZOL 7) 0.4 % vaginal cream Insert 1 Applicator into vagina nightly.  18   [address] [place] [address], NP   PRENATAL VIT CALC,IRON,FOLIC (PRENATAL VITAMIN PO) Take  by mouth. Wesley Goff MD        Review of Systems:  A comprehensive review of systems was negative except for that written in the History of Present Illness. Objective:     Vitals:    Vitals:    09/10/18 0715   BP: 123/80   Pulse: (!) 108   Resp: 16   Temp: 98.2 °F (36.8 °C)      Temp (24hrs), Av.2 °F (36.8 °C), Min:98.2 °F (36.8 °C), Max:98.2 °F (36.8 °C)    BP  Min: 123/80  Max: 123/80     Physical Exam:  Patient without distress. Abdomen: soft, nontender  Cervical Exam: /-2-3 cm dilated       Membranes:  Intact  Uterine Activity:  occasional  Fetal Heart Rate:  Non  Reactive, occasional variable deceleration. Lab/Data Review:  No results found for this or any previous visit (from the past 24 hour(s)). Assessment and Plan:     Principal Problem:    IUGR (intrauterine growth restriction) affecting care of mother, third trimester, fetus 1 (2018)      Overview: 18  EFW 6%., 2076 grams, 4 lb. 9 oz., Doppler umbilical artery wnl. Active Problems:    35 weeks gestation of pregnancy (2018)      Anemia during pregnancy in third trimester (9/10/2018)       Place SCD's. OCT this morning. Complete second dose of betamethasone. Will contact [facility] today for appointment asap.      Signed By:  Bobbi Chamberlain MD     September 10, 2018

## 2018-09-10 NOTE — PROGRESS NOTES
0715 Bedside and verbal report received from Fatmata Cabrera, RN.    0720 Pt up to restroom. 0810 Pt eating breakfast. Denies needs at this time. 2937 Dr. Earlene Amin at bedside. SVE:1/50/-3 unchanged. Orders received to conduct another contraction stress test today, apply SCD's, continue to monitor pt until she gets her second dose of celestone at 0144 tomorrow. 0930 SCD's applied. 5916 Pitocin started for contraction stress test.     1100 3 contractions noted in a 10 minute period. Moderate variability. No deccerations or accelerations noted at this time. Pt verbalized understanding. Pt up to restroom. 200 Dr. Earlene Amin notified via text page. 1200 Pt up to restroom. 1400 Pt up to restroom. [place] with Dr. Earlene Amin. Reviewed assessment and FHT. Pt allowed to get up to shower. 1800 Pt up to shower. 1850 Pt placed on EFM. SCD's reapplied. Dinner at bedside. 1910 Bedside and verbal report given to BEN Avitia, WILIAN.

## 2018-09-10 NOTE — PROGRESS NOTES
2332 Received to L & D unit with c/o cramping, back pain and leaking fluid. Assisted to BR to change into a gown and for urine sample. . 35 weeks patient of Dr. Theo Sheth. Snajuana Gutierrez. Pregnancy is complicated by SGA.     5103 Perineum is dry. No fluid on glove after exam. Nitrazine is negative. 5136 Dr. Avelino Chung paged and informed if of pt's arrival with c/o back, cramping and leaking fluid, Nitrazine negative, no fluid noted, SVE unchanged from previous exam, non-reactive EFM tracing. Orders for continued observation, IV hydration and steroids. 0230 Dr. Clif Arce is at desk. EFM tracing reviewed. [facility] adjusted. 0715 Bedside and Verbal shift change report given to CORI Alexandra RN (oncoming nurse) by BEN Coleman RNC (offgoing nurse). Report included the following information SBAR, Intake/Output and MAR.

## 2018-09-11 PROCEDURE — 74011250636 HC RX REV CODE- 250/636: Performed by: OBSTETRICS & GYNECOLOGY

## 2018-09-11 PROCEDURE — 65270000029 HC RM PRIVATE

## 2018-09-11 PROCEDURE — 93005 ELECTROCARDIOGRAM TRACING: CPT

## 2018-09-11 RX ORDER — ZOLPIDEM TARTRATE 5 MG/1
10 TABLET ORAL
Status: DISCONTINUED | OUTPATIENT
Start: 2018-09-11 | End: 2018-09-11

## 2018-09-11 RX ORDER — ZOLPIDEM TARTRATE 5 MG/1
5 TABLET ORAL
Status: DISCONTINUED | OUTPATIENT
Start: 2018-09-11 | End: 2018-09-13 | Stop reason: SDUPTHER

## 2018-09-11 RX ORDER — ACETAMINOPHEN 500 MG
1000 TABLET ORAL
Status: DISCONTINUED | OUTPATIENT
Start: 2018-09-11 | End: 2018-09-15 | Stop reason: HOSPADM

## 2018-09-11 RX ADMIN — BETAMETHASONE SODIUM PHOSPHATE AND BETAMETHASONE ACETATE 12 MG: 3; 3 INJECTION, SUSPENSION INTRA-ARTICULAR; INTRALESIONAL; INTRAMUSCULAR at 02:10

## 2018-09-11 RX ADMIN — SODIUM CHLORIDE, SODIUM LACTATE, POTASSIUM CHLORIDE, AND CALCIUM CHLORIDE 125 ML/HR: 600; 310; 30; 20 INJECTION, SOLUTION INTRAVENOUS at 00:00

## 2018-09-11 NOTE — PROGRESS NOTES
0818 Bedside and Verbal shift change report given to  Karl Gillette RN (oncoming nurse) by BEN Harding, [address] (offgoing nurse). Report included the following information SBAR, Kardex, Intake/Output and MAR.     8620 patient sleeping. 6276 Patient resting, ultrasound brought to bedside. Patient denies needs. Reports good fetal movement overnight. [id number] Dr. Kelsey Walker at bedside for Ultrasound, JACKELINE WNL. Patient is to order breakfast and will perform BPP.     1004 Dr. Ishaan El     1017 Dr. Melodie Fernandes at bedside for ultrasound for BPP    1043 8/10 BPP ( points deducted for non-reactive NST) Dr. Ishaan El consulting with M to determine POC. 1110 Patient will stay for continued observation due to Non-reactive NST. Continue with continuous monitoring at this time, patient may be transferred later if needed and if FHT does not worsen. patient may continue with regular diet, may shower prn. MD aware of periodic decelerations, RN to notify MD if decelerations worsen or become more persistent. [phone] Patient informed of updated POC, verbalizes understanding, denies needs. [address] adjusted. Patient PO hydrating, denies needs. 1415 Patient turned to right lateral, US and TOCO adjusted. Denies needs. 1002 [address] reviewed with Dr. Kelsey Walker. If needed, patient may be transferred to /B with BID NSTs    1709 Patient turned to right lateral, US and TOCO adjusted, denies needs. [address] paged CATHRYN Walden, [address] called unit. CNM informed of 2.5 minute prolonged deceleration during/after contraction. Resolved with position change. Will continue to keep patient on continuous monitoring    1747 Tracing maternal during position change. US and TOCO adjusted. Patient denies pain or needs. 1847 Patient encouraged to lay in side lying positions, inform nurse if contractions get more intense or frequent.  Patient verbalizes understanding    1910 Bedside and Verbal shift change report given to Aniceto Melgoza RN (oncoming nurse) by Tato Julio (offgoing nurse). Report included the following information SBAR, Kardex, Intake/Output and MAR.

## 2018-09-11 NOTE — PROGRESS NOTES
1910 Bedside and Verbal shift change report given to SAMIA Jennings RN (oncoming nurse) by Rosy Willson RN (offgoing nurse). Report included the following information SBAR, Kardex, Intake/Output, MAR and Recent Results. 1924 Head to toe assessment complete. Denies headache, blurry vision, floaters or epigastric pain. Pain 8/10. VSS. 2005 CATHRYN Painting given update on patient. Patient is to continue bedrest with bathroom privileges, continuous monitoring per CN. 2200 Patient resting comfortably. Denies needs. Call light within reach. 0000 Patient sleeping. Call light within reach.

## 2018-09-11 NOTE — PROGRESS NOTES
1915 Bedside report received from CORI Oliva. 1930 Assessment complete. Denies needs. Bilateral SCD's on lower extremities. 0300 Resting in bed with eyes closed. [id number] Respiratory therapy at bedside for EKG. 1692 Dr. Maik Lopez called and informed of c/o chest pain which has now resolved. EKG is normal sinus rhythm, pulse 02 100%, Bilateral breath sounds are clear. Orders for Maalox received. 0515 Resting in bed with eyes closed.

## 2018-09-11 NOTE — PROGRESS NOTES
High Risk Obstetrics Progress Note    Name: Elsa Aden MRN: [id number]  SSN: [id number]    YOB: 1995  Age: 23 y.o. Sex: female      Subjective:      LOS: 1 day    Estimated Date of Delivery: 10/12/18   Gestational Age Today: 29w2d     Patient admitted for fetal growth restriction. States she does not have chest pain, contractions, headache , nausea and vomiting, right upper quadrant pain  , vaginal bleeding  and vaginal leaking of fluid . Objective:     Vitals:  Blood pressure 100/59, pulse (!) 107, temperature 98.2 °F (36.8 °C), resp. rate 16, last menstrual period 2018, SpO2 100 %. Temp (24hrs), Av.3 °F (36.8 °C), Min:98.2 °F (36.8 °C), Max:98.6 °F (37 °C)    Systolic (41CAB), WGS:400 , Min:93 , AHX:310      Diastolic (50ZQI), DSU:02, Min:52, Max:76       Intake and Output:          Physical Exam:  Patient without distress. Abdomen: soft, nontender  Lower Extremities:  - Edema No       Membranes:  Intact    Uterine Activity:  rare    Fetal Heart Rate:  Non reactive. Mild variability. Ultrasound:  Vertex presentation, SDP 6.23, no breathing noted, positive tone. Labs:   Recent Results (from the past 36 hour(s))   EKG, 12 LEAD, INITIAL    Collection Time: 18  4:02 AM   Result Value Ref Range    Ventricular Rate 88 BPM    Atrial Rate 88 BPM    P-R Interval 126 ms    QRS Duration 76 ms    Q-T Interval 378 ms    QTC Calculation (Bezet) 457 ms    Calculated P Axis 29 degrees    Calculated R Axis 32 degrees    Calculated T Axis 18 degrees    Diagnosis       Normal sinus rhythm  Normal ECG  No previous ECGs available         Assessment and Plan:      Principal Problem:    IUGR (intrauterine growth restriction) affecting care of mother, third trimester, fetus 1 (2018)      Overview: 18  EFW 6%., 2076 grams, 4 lb. 9 oz., Doppler umbilical artery wnl.     Active Problems:    35 weeks gestation of pregnancy (2018)      Anemia during pregnancy in third trimester (9/10/2018)      Pregnancy with 35 completed weeks gestation (9/10/2018)       will perform BPP after breakfast. Patient has not yet eaten today.       Signed By: Cindy Benitez MD     September 11, 2018

## 2018-09-11 NOTE — PROGRESS NOTES
Spoke to Dr. Chambers at [place] [address]. She recommends delivery if I feel that the situation is deteriorating, if amniotic fluid is low, variables become more frequent, NST with no variability, otherwise continue to monitor closely. Will continue inpatient observation at this point. If patient remains very stable, may possibly be able to manage as outpatient.

## 2018-09-12 PROCEDURE — 77030018749 HC HK AMNIO DISP DERY -A

## 2018-09-12 PROCEDURE — 74011250636 HC RX REV CODE- 250/636: Performed by: OBSTETRICS & GYNECOLOGY

## 2018-09-12 PROCEDURE — 65270000029 HC RM PRIVATE

## 2018-09-12 PROCEDURE — 77030028565 HC CATH CERV RIPNG BLN COOK -B

## 2018-09-12 PROCEDURE — 74011250637 HC RX REV CODE- 250/637: Performed by: OBSTETRICS & GYNECOLOGY

## 2018-09-12 PROCEDURE — 59200 INSERT CERVICAL DILATOR: CPT

## 2018-09-12 PROCEDURE — 3E033VJ INTRODUCTION OF OTHER HORMONE INTO PERIPHERAL VEIN, PERCUTANEOUS APPROACH: ICD-10-PCS | Performed by: OBSTETRICS & GYNECOLOGY

## 2018-09-12 PROCEDURE — 75410000002 HC LABOR FEE PER 1 HR

## 2018-09-12 RX ORDER — TERBUTALINE SULFATE 1 MG/ML
0.25 INJECTION SUBCUTANEOUS
Status: DISCONTINUED | OUTPATIENT
Start: 2018-09-12 | End: 2018-09-13 | Stop reason: HOSPADM

## 2018-09-12 RX ORDER — LIDOCAINE HYDROCHLORIDE 10 MG/ML
20 INJECTION, SOLUTION EPIDURAL; INFILTRATION; INTRACAUDAL; PERINEURAL AS NEEDED
Status: DISCONTINUED | OUTPATIENT
Start: 2018-09-12 | End: 2018-09-13 | Stop reason: HOSPADM

## 2018-09-12 RX ORDER — OXYTOCIN/0.9 % SODIUM CHLORIDE 30/500 ML
1-25 PLASTIC BAG, INJECTION (ML) INTRAVENOUS
Status: DISCONTINUED | OUTPATIENT
Start: 2018-09-12 | End: 2018-09-13 | Stop reason: HOSPADM

## 2018-09-12 RX ORDER — NALBUPHINE HYDROCHLORIDE 10 MG/ML
10 INJECTION, SOLUTION INTRAMUSCULAR; INTRAVENOUS; SUBCUTANEOUS
Status: DISCONTINUED | OUTPATIENT
Start: 2018-09-12 | End: 2018-09-13 | Stop reason: HOSPADM

## 2018-09-12 RX ORDER — BUTORPHANOL TARTRATE 1 MG/ML
2 INJECTION INTRAMUSCULAR; INTRAVENOUS
Status: DISCONTINUED | OUTPATIENT
Start: 2018-09-12 | End: 2018-09-13 | Stop reason: HOSPADM

## 2018-09-12 RX ORDER — OXYTOCIN/RINGER'S LACTATE 20/1000 ML
500 PLASTIC BAG, INJECTION (ML) INTRAVENOUS ONCE
Status: ACTIVE | OUTPATIENT
Start: 2018-09-12 | End: 2018-09-12

## 2018-09-12 RX ORDER — ONDANSETRON 2 MG/ML
4 INJECTION INTRAMUSCULAR; INTRAVENOUS
Status: DISCONTINUED | OUTPATIENT
Start: 2018-09-12 | End: 2018-09-13 | Stop reason: HOSPADM

## 2018-09-12 RX ORDER — MINERAL OIL
30 OIL (ML) ORAL AS NEEDED
Status: COMPLETED | OUTPATIENT
Start: 2018-09-12 | End: 2018-09-13

## 2018-09-12 RX ORDER — SODIUM CHLORIDE, SODIUM LACTATE, POTASSIUM CHLORIDE, CALCIUM CHLORIDE 600; 310; 30; 20 MG/100ML; MG/100ML; MG/100ML; MG/100ML
125 INJECTION, SOLUTION INTRAVENOUS AS NEEDED
Status: DISCONTINUED | OUTPATIENT
Start: 2018-09-12 | End: 2018-09-15 | Stop reason: HOSPADM

## 2018-09-12 RX ORDER — OXYTOCIN/0.9 % SODIUM CHLORIDE 30/500 ML
0-20 PLASTIC BAG, INJECTION (ML) INTRAVENOUS
Status: DISCONTINUED | OUTPATIENT
Start: 2018-09-12 | End: 2018-09-15 | Stop reason: HOSPADM

## 2018-09-12 RX ORDER — METHYLERGONOVINE MALEATE 0.2 MG/ML
0.2 INJECTION INTRAVENOUS AS NEEDED
Status: DISCONTINUED | OUTPATIENT
Start: 2018-09-12 | End: 2018-09-13 | Stop reason: HOSPADM

## 2018-09-12 RX ORDER — HYDROMORPHONE HYDROCHLORIDE 2 MG/ML
1 INJECTION, SOLUTION INTRAMUSCULAR; INTRAVENOUS; SUBCUTANEOUS
Status: DISCONTINUED | OUTPATIENT
Start: 2018-09-12 | End: 2018-09-13 | Stop reason: HOSPADM

## 2018-09-12 RX ADMIN — SODIUM CHLORIDE, SODIUM LACTATE, POTASSIUM CHLORIDE, AND CALCIUM CHLORIDE 125 ML/HR: 600; 310; 30; 20 INJECTION, SOLUTION INTRAVENOUS at 18:05

## 2018-09-12 RX ADMIN — ACETAMINOPHEN 1000 MG: 500 TABLET, FILM COATED ORAL at 10:12

## 2018-09-12 RX ADMIN — OXYTOCIN 2 MILLI-UNITS/MIN: 10 INJECTION, SOLUTION INTRAMUSCULAR; INTRAVENOUS at 18:05

## 2018-09-12 NOTE — ROUTINE PROCESS
0715:  Bedside and Verbal shift change report given to Lonnie Roque RN   (oncoming nurse) by Darlin Libman, RN (offgoing nurse). Report included the following information SBAR, MAR and Recent Results. Alarm parameters reviewed and opportunities for questions provided. 0730:  Patient provided with ice chips, ice water, juice as requested. 1476:  Patient sitting up in bed eating breakfast, intermittent tracing maternal heart rate due to patient movement and positioning. This RN at bedside adjusting EFM. Patient assisted with positioning in postpartum bed.     0825:  Dr. Yue Hollingsworth reviewed strip from 9/11/18 and 9/12/18.    0840:  Dr. Angie Valenzuela at bedside assessing patient. SVE 1/50/-2. Plan of care discussed with patient for induction of labor due to non-reassuring fetal heart rate noted due to 2 decelerations. 8821Marcello Searing balloon placed by Dr. Angie Valenzuela. Order received for regular diet while cook in place. Remove cook balloon at 2100 tonight and start pitocin induction at that time. At that time patient is NPO with ice chips. Patient verbalized understanding and agreement with plan of care. 1000:  Discussed with Dr. Jeremy carlin and Dr. Jennings Aw reported estimated fetal weight per ultrasound on 9/7/18 as 2076 grams - 4 pounds 9 ounces - 6th percentile. 1010: patient request tylenol for pelvic pain and pressure and cramping. Tylenol given (see MAR). 1100: patient reports some relief in discomfort after tylenol and repositioning, but continues to report some pelvic pain and cramping and discomfort. Patient declines further medication intervention at this time. Patient resting left lateral, EFM and TOCO adjusted. 1300:  Patient assisted with repositioning standing at the bedside per request as patient states she is more comfortable out of the bed.     1440:  Patient assisted with positioning on the birth ball for comfort and rest. Patient states increased comfort with getting out of bed.    1505:  Patient positioned on birth ball. This RN adjusting EFM and TOCO - intermittent tracing maternal.    1620:  Nel Augustine balloon fell out. SVE 4-5/60/-3/BBOW. 1920: Bedside and Verbal shift change report given to Lionel Camarillo RN (oncoming nurse) by Yarelis Watson RN   (offgoing nurse). Report included the following information SBAR, MAR and Recent Results. Alarm parameters reviewed and opportunities for questions provided.

## 2018-09-12 NOTE — ROUTINE PROCESS
Bedside and Verbal shift change report given to Suly Helms RN (oncoming nurse) by Lanette Pulido (offgoing nurse).  Report included the following information SBAR, Kardex, Intake/Output, MAR, and Recent Results

## 2018-09-12 NOTE — PROGRESS NOTES
High Risk Obstetrics Progress Note    Name: Gretel Mason MRN: [id number]  SSN: [id number]    YOB: 1995  Age: 23 y.o. Sex: female      Subjective:      LOS: 2 days    Estimated Date of Delivery: 10/12/18   Gestational Age Today: 27w7d     Patient admitted for fetal growth restriction. States she does not have abdominal pain  , right upper quadrant pain  , vaginal bleeding  and vaginal leaking of fluid . Objective:     Vitals:  Blood pressure 126/71, pulse 84, temperature 98.2 °F (36.8 °C), resp. rate 16, height 5' 2\" (1.575 m), weight 140 lb (63.5 kg), last menstrual period 2018, SpO2 100 %. Temp (24hrs), Av.2 °F (36.8 °C), Min:97.9 °F (36.6 °C), Max:98.3 °F (42.0 °C)    Systolic (16WVQ), AWQ:581 , Min:102 , NLZ:225      Diastolic (25KEX), UEO:25, Min:50, Max:71              Physical Exam:  Patient without distress. Abdomen:  Soft, gravid, non tender  Pelvic:  1/50/-2/anterior/soft    Membranes:  Intact    Uterine Activity:  occasional    Fetal Heart Rate:  Baseline: 150's per minute, non reactive. Overnight patient has had several episodes of spontaneous decelerations and an episode of late deceleration after a contraction. Labs:   Recent Results (from the past 36 hour(s))   EKG, 12 LEAD, INITIAL    Collection Time: 18  4:02 AM   Result Value Ref Range    Ventricular Rate 88 BPM    Atrial Rate 88 BPM    P-R Interval 126 ms    QRS Duration 76 ms    Q-T Interval 378 ms    QTC Calculation (Bezet) 457 ms    Calculated P Axis 29 degrees    Calculated R Axis 32 degrees    Calculated T Axis 18 degrees    Diagnosis       Normal sinus rhythm  Normal ECG  No previous ECGs available         Assessment and Plan:      Principal Problem:    IUGR (intrauterine growth restriction) affecting care of mother, third trimester, fetus 1 (2018)      Overview: 18  EFW 6%., 2076 grams, 4 lb. 9 oz., Doppler umbilical artery wnl.     Active Problems:    35 weeks gestation of pregnancy (9/7/2018)      Anemia during pregnancy in third trimester (9/10/2018)      Pregnancy with 35 completed weeks gestation (9/10/2018)     After review of fetal monitor strip, recommend we proceed with delivery for worsening fetal status. Will place Clance Bevels catheter. Consult neonatologist.  Patient agrees to plan.     Signed By: Steve Pillai MD     September 12, 2018

## 2018-09-13 ENCOUNTER — ANESTHESIA (OUTPATIENT)
Dept: LABOR AND DELIVERY | Age: 23
DRG: 560 | End: 2018-09-13
Payer: MEDICAID

## 2018-09-13 ENCOUNTER — ANESTHESIA EVENT (OUTPATIENT)
Dept: LABOR AND DELIVERY | Age: 23
DRG: 560 | End: 2018-09-13
Payer: MEDICAID

## 2018-09-13 LAB
ABO + RH BLD: NORMAL
BASOPHILS # BLD: 0 K/UL (ref 0–0.1)
BASOPHILS NFR BLD: 0 % (ref 0–2)
BLOOD GROUP ANTIBODIES SERPL: NORMAL
DIFFERENTIAL METHOD BLD: ABNORMAL
EOSINOPHIL # BLD: 0 K/UL (ref 0–0.4)
EOSINOPHIL NFR BLD: 0 % (ref 0–5)
ERYTHROCYTE [DISTWIDTH] IN BLOOD BY AUTOMATED COUNT: 17 % (ref 11.6–14.5)
HCT VFR BLD AUTO: 33.7 % (ref 35–45)
HGB BLD-MCNC: 10.5 G/DL (ref 12–16)
LYMPHOCYTES # BLD: 2.3 K/UL (ref 0.9–3.6)
LYMPHOCYTES NFR BLD: 15 % (ref 21–52)
MCH RBC QN AUTO: 23.3 PG (ref 24–34)
MCHC RBC AUTO-ENTMCNC: 31.2 G/DL (ref 31–37)
MCV RBC AUTO: 74.9 FL (ref 74–97)
MONOCYTES # BLD: 1.3 K/UL (ref 0.05–1.2)
MONOCYTES NFR BLD: 8 % (ref 3–10)
NEUTS SEG # BLD: 12.4 K/UL (ref 1.8–8)
NEUTS SEG NFR BLD: 77 % (ref 40–73)
PLATELET # BLD AUTO: 193 K/UL (ref 135–420)
PMV BLD AUTO: 10.4 FL (ref 9.2–11.8)
RBC # BLD AUTO: 4.5 M/UL (ref 4.2–5.3)
SPECIMEN EXP DATE BLD: NORMAL
WBC # BLD AUTO: 16 K/UL (ref 4.6–13.2)

## 2018-09-13 PROCEDURE — 74011250637 HC RX REV CODE- 250/637: Performed by: OBSTETRICS & GYNECOLOGY

## 2018-09-13 PROCEDURE — 75410000003 HC RECOV DEL/VAG/CSECN EA 0.5 HR

## 2018-09-13 PROCEDURE — 85025 COMPLETE CBC W/AUTO DIFF WBC: CPT | Performed by: OBSTETRICS & GYNECOLOGY

## 2018-09-13 PROCEDURE — 65270000029 HC RM PRIVATE

## 2018-09-13 PROCEDURE — 74011250636 HC RX REV CODE- 250/636: Performed by: OBSTETRICS & GYNECOLOGY

## 2018-09-13 PROCEDURE — 86900 BLOOD TYPING SEROLOGIC ABO: CPT | Performed by: OBSTETRICS & GYNECOLOGY

## 2018-09-13 PROCEDURE — 88307 TISSUE EXAM BY PATHOLOGIST: CPT | Performed by: OBSTETRICS & GYNECOLOGY

## 2018-09-13 PROCEDURE — 36415 COLL VENOUS BLD VENIPUNCTURE: CPT | Performed by: OBSTETRICS & GYNECOLOGY

## 2018-09-13 PROCEDURE — 77030007879 HC KT SPN EPDRL TELE -B: Performed by: ANESTHESIOLOGY

## 2018-09-13 PROCEDURE — 74011000250 HC RX REV CODE- 250

## 2018-09-13 PROCEDURE — 75410000000 HC DELIVERY VAGINAL/SINGLE

## 2018-09-13 PROCEDURE — 75410000002 HC LABOR FEE PER 1 HR

## 2018-09-13 PROCEDURE — 74011250636 HC RX REV CODE- 250/636

## 2018-09-13 PROCEDURE — 76060000078 HC EPIDURAL ANESTHESIA

## 2018-09-13 PROCEDURE — 74011000258 HC RX REV CODE- 258: Performed by: OBSTETRICS & GYNECOLOGY

## 2018-09-13 RX ORDER — SODIUM CHLORIDE 0.9 % (FLUSH) 0.9 %
5-10 SYRINGE (ML) INJECTION AS NEEDED
Status: DISCONTINUED | OUTPATIENT
Start: 2018-09-13 | End: 2018-09-13 | Stop reason: HOSPADM

## 2018-09-13 RX ORDER — LIDOCAINE HYDROCHLORIDE AND EPINEPHRINE 15; 5 MG/ML; UG/ML
INJECTION, SOLUTION EPIDURAL AS NEEDED
Status: DISCONTINUED | OUTPATIENT
Start: 2018-09-13 | End: 2018-09-13 | Stop reason: HOSPADM

## 2018-09-13 RX ORDER — IBUPROFEN 400 MG/1
800 TABLET ORAL
Status: DISCONTINUED | OUTPATIENT
Start: 2018-09-13 | End: 2018-09-15 | Stop reason: HOSPADM

## 2018-09-13 RX ORDER — AMPICILLIN 1 G/1
1 INJECTION, POWDER, FOR SOLUTION INTRAMUSCULAR; INTRAVENOUS EVERY 6 HOURS
Status: DISCONTINUED | OUTPATIENT
Start: 2018-09-13 | End: 2018-09-13 | Stop reason: RX

## 2018-09-13 RX ORDER — SODIUM CHLORIDE 0.9 % (FLUSH) 0.9 %
5-10 SYRINGE (ML) INJECTION EVERY 8 HOURS
Status: DISCONTINUED | OUTPATIENT
Start: 2018-09-13 | End: 2018-09-13 | Stop reason: HOSPADM

## 2018-09-13 RX ORDER — FENTANYL CITRATE 50 UG/ML
INJECTION, SOLUTION INTRAMUSCULAR; INTRAVENOUS AS NEEDED
Status: DISCONTINUED | OUTPATIENT
Start: 2018-09-13 | End: 2018-09-13 | Stop reason: HOSPADM

## 2018-09-13 RX ORDER — FENTANYL/ROPIVACAINE/NS/PF 2MCG/ML-.1
PLASTIC BAG, INJECTION (ML) EPIDURAL
Status: COMPLETED
Start: 2018-09-13 | End: 2018-09-13

## 2018-09-13 RX ORDER — NALBUPHINE HYDROCHLORIDE 10 MG/ML
2.5 INJECTION, SOLUTION INTRAMUSCULAR; INTRAVENOUS; SUBCUTANEOUS
Status: DISCONTINUED | OUTPATIENT
Start: 2018-09-13 | End: 2018-09-13 | Stop reason: HOSPADM

## 2018-09-13 RX ORDER — ZOLPIDEM TARTRATE 5 MG/1
5 TABLET ORAL
Status: DISCONTINUED | OUTPATIENT
Start: 2018-09-13 | End: 2018-09-15 | Stop reason: HOSPADM

## 2018-09-13 RX ORDER — AMOXICILLIN 250 MG
1 CAPSULE ORAL
Status: DISCONTINUED | OUTPATIENT
Start: 2018-09-13 | End: 2018-09-15 | Stop reason: HOSPADM

## 2018-09-13 RX ORDER — PHENYLEPHRINE HCL IN 0.9% NACL 1 MG/10 ML
80 SYRINGE (ML) INTRAVENOUS AS NEEDED
Status: DISCONTINUED | OUTPATIENT
Start: 2018-09-13 | End: 2018-09-13 | Stop reason: HOSPADM

## 2018-09-13 RX ORDER — ACETAMINOPHEN 325 MG/1
650 TABLET ORAL
Status: DISCONTINUED | OUTPATIENT
Start: 2018-09-13 | End: 2018-09-15 | Stop reason: HOSPADM

## 2018-09-13 RX ORDER — OXYCODONE AND ACETAMINOPHEN 5; 325 MG/1; MG/1
2 TABLET ORAL
Status: DISCONTINUED | OUTPATIENT
Start: 2018-09-13 | End: 2018-09-15 | Stop reason: HOSPADM

## 2018-09-13 RX ORDER — NALOXONE HYDROCHLORIDE 0.4 MG/ML
0.2 INJECTION, SOLUTION INTRAMUSCULAR; INTRAVENOUS; SUBCUTANEOUS AS NEEDED
Status: DISCONTINUED | OUTPATIENT
Start: 2018-09-13 | End: 2018-09-13 | Stop reason: HOSPADM

## 2018-09-13 RX ORDER — FENTANYL CITRATE 50 UG/ML
100 INJECTION, SOLUTION INTRAMUSCULAR; INTRAVENOUS ONCE
Status: DISCONTINUED | OUTPATIENT
Start: 2018-09-13 | End: 2018-09-13 | Stop reason: HOSPADM

## 2018-09-13 RX ORDER — FENTANYL CITRATE 50 UG/ML
INJECTION, SOLUTION INTRAMUSCULAR; INTRAVENOUS
Status: COMPLETED
Start: 2018-09-13 | End: 2018-09-13

## 2018-09-13 RX ORDER — FENTANYL/ROPIVACAINE/NS/PF 2MCG/ML-.1
1-22 PLASTIC BAG, INJECTION (ML) EPIDURAL
Status: DISCONTINUED | OUTPATIENT
Start: 2018-09-13 | End: 2018-09-13 | Stop reason: HOSPADM

## 2018-09-13 RX ORDER — PROMETHAZINE HYDROCHLORIDE 25 MG/ML
25 INJECTION, SOLUTION INTRAMUSCULAR; INTRAVENOUS
Status: DISCONTINUED | OUTPATIENT
Start: 2018-09-13 | End: 2018-09-15 | Stop reason: HOSPADM

## 2018-09-13 RX ORDER — DIPHENHYDRAMINE HYDROCHLORIDE 50 MG/ML
25 INJECTION, SOLUTION INTRAMUSCULAR; INTRAVENOUS
Status: DISCONTINUED | OUTPATIENT
Start: 2018-09-13 | End: 2018-09-13 | Stop reason: HOSPADM

## 2018-09-13 RX ORDER — OXYTOCIN/RINGER'S LACTATE 20/1000 ML
PLASTIC BAG, INJECTION (ML) INTRAVENOUS
Status: COMPLETED
Start: 2018-09-13 | End: 2018-09-13

## 2018-09-13 RX ADMIN — ROPIVACAINE HYDROCHLORIDE 15 ML/HR: 10 INJECTION, SOLUTION EPIDURAL at 12:00

## 2018-09-13 RX ADMIN — OXYTOCIN 18 MILLI-UNITS/MIN: 10 INJECTION, SOLUTION INTRAMUSCULAR; INTRAVENOUS at 04:08

## 2018-09-13 RX ADMIN — Medication 30 ML: at 12:58

## 2018-09-13 RX ADMIN — OXYTOCIN 20 MILLI-UNITS/MIN: 10 INJECTION, SOLUTION INTRAMUSCULAR; INTRAVENOUS at 04:38

## 2018-09-13 RX ADMIN — Medication 15 ML/HR: at 12:00

## 2018-09-13 RX ADMIN — LIDOCAINE HYDROCHLORIDE AND EPINEPHRINE 5 ML: 15; 5 INJECTION, SOLUTION EPIDURAL at 11:57

## 2018-09-13 RX ADMIN — Medication 20000 MILLI-UNITS: at 12:59

## 2018-09-13 RX ADMIN — OXYTOCIN 12 MILLI-UNITS/MIN: 10 INJECTION, SOLUTION INTRAMUSCULAR; INTRAVENOUS at 02:06

## 2018-09-13 RX ADMIN — FENTANYL CITRATE 100 MCG: 50 INJECTION, SOLUTION INTRAMUSCULAR; INTRAVENOUS at 11:59

## 2018-09-13 RX ADMIN — AMPICILLIN SODIUM 1 G: 1 INJECTION, POWDER, FOR SOLUTION INTRAMUSCULAR; INTRAVENOUS at 09:52

## 2018-09-13 RX ADMIN — OXYTOCIN 14 MILLI-UNITS/MIN: 10 INJECTION, SOLUTION INTRAMUSCULAR; INTRAVENOUS at 02:55

## 2018-09-13 RX ADMIN — OXYTOCIN 2 MILLI-UNITS/MIN: 10 INJECTION, SOLUTION INTRAMUSCULAR; INTRAVENOUS at 09:51

## 2018-09-13 RX ADMIN — ACETAMINOPHEN 1000 MG: 500 TABLET, FILM COATED ORAL at 04:58

## 2018-09-13 RX ADMIN — ONDANSETRON 4 MG: 2 INJECTION INTRAMUSCULAR; INTRAVENOUS at 10:25

## 2018-09-13 RX ADMIN — OXYTOCIN 16 MILLI-UNITS/MIN: 10 INJECTION, SOLUTION INTRAMUSCULAR; INTRAVENOUS at 03:36

## 2018-09-13 NOTE — PROGRESS NOTES
Pelvic exam:  Cervix3, Effaced:75%Station:-2 forebag ruptured    Pt with irreg ctx off pitocin wanted a break . Off since 4 am, pt willing to restart now. Will add amp since  rom since 10 pm. fhr with change .  Pt states baby active

## 2018-09-13 NOTE — PROGRESS NOTES
Pelvic exam:  Cervix9, Effaced:100%Station:0 fhr stable.  Just received epidural, anticipate vaginal delivery soon

## 2018-09-13 NOTE — ROUTINE PROCESS
1945: Bedside and Verbal shift change report given to SAMIA Meadows (oncoming nurse) by JANIS Francis (offgoing nurse). Report included the following information SBAR, Intake/Output, MAR and Recent Results. 2220: SVE by this nurse. 4/60/-2    2250: SROM, clear, moderate. 0250: Dr. Elena Pennr at bedside. SVE 3-4/100/-3. Orders to increase pit by 2 every 30 minutes. [id number]: Trouble with IV pump beeping repeatedly. Had several nurses attempt to insert new IV site. Got site in right hand but wouldn't advance and pump started beeping again. Patient refused pitocin and LR.      0600: Discussed need for IV site with patient. 0630: Called Dr. Parker  and explained patient's desire to have a break from IV and rest and also eat. Dr. Parker  agreed that she could take a break and eat before we get a new IV site and re-start pitocin. 0715: Bedside and Verbal shift change report given to Octavia Martinez (oncoming nurse) by SAMIA Meadows (offgoing nurse). Report included the following information SBAR, Intake/Output, MAR and Recent Results.

## 2018-09-13 NOTE — PROGRESS NOTES
1218 Bedside and Verbal shift change report given to An Kat RN (oncoming nurse) by Gerald Mathews RN (offgoing nurse). Report included the following information SBAR, Kardex, Intake/Output and MAR.     1237 Dr. Rocky Massey at bedside. /+1. Patient turned to right lateral.     1255 complete     1559 patient pushing with Dr. Rocky Massey and RN at bedside continuously evaluating FHT    1259  of viable female infant, no nuchal noted, shoulders delivered without difficulty,  spontaneous cry noted upon delivery, infant dried and warmed and placed skin to skin. Delayed cord clamping. Cord clamped and cut by family member then infant placed skin to skin on mothers chest, covered in warm blankets. 1303  of intact, normal looking placenta. Edgar Humphrey 66 performed, pads and icepack applied. Linens changed. 1325 patient resting, family at bedside, denies needs. [id number] 1914 infant remains skin to skin breastfeeding, patient has menu at bedside. Denies needs. 1450 Patient assisted to restroom with minimal assistance, no difficulty voiding, gown, pads, icepack, mesh underwear applied. Patient ambulates back to bed. Denies needs. 1550  TRANSFER - OUT  Verbal SBAR report given to BEN Escobar Rn(name) on Doris Hollis  being transferred to [facility] (unit) for routine progression of care       Report consisted of patients Situation, Background, Assessment and   Recommendations(SBAR). Information from the following report(s) SBAR, Kardex, Intake/Output and MAR was reviewed with the receiving nurse.     Lines:   Peripheral IV 18 Right Hand (Active)   Site Assessment Clean, dry, & intact 2018 12:18 PM   Phlebitis Assessment 0 2018 12:18 PM   Infiltration Assessment 0 2018 12:18 PM   Dressing Status Clean, dry, & intact 2018 12:18 PM   Dressing Type Tape;Transparent 2018 12:18 PM   Hub Color/Line Status Infusing 2018 12:18 PM   Alcohol Cap Used Yes 2018 12:18 PM Opportunity for questions and clarification was provided.       Patient transported with:   Registered Nurse

## 2018-09-13 NOTE — ROUTINE PROCESS
1259:   of viable female infant. Infant dried and placed skin to skin with mother. Dr. Chris Rae at bedside at 1 minute of life, APGAR 9, Dr. Chris Rae with return to assess infant after skin to skin bonding. 1329:  Dr. Chris Rae at bedside assessing infant.

## 2018-09-13 NOTE — PROGRESS NOTES
4531 Verbal shift change report given to David Fan RN (oncoming nurse) by Angeles Guerrero (offgoing nurse). Report included the following information SBAR, Kardex, Intake/Output, MAR and Recent Results. 9416 oxytocin started as ordered by Dr. Luan Villalobos. 1025 Patient vomiting. Zofran administered for nausea/vomiting. 1035 Patient reports feeling rectal pressure, SVE 5/100/-1    1109 Bedside and Verbal shift change report given to Gerber Lay RN (oncoming nurse) by David Fan RN (offgoing nurse). Report included the following information SBAR, Kardex, Intake/Output, MAR and Recent Results.

## 2018-09-13 NOTE — PROGRESS NOTES
1110 SBAR received from Aniya Ambrocio RN and patient care assumed.   Patient in pain; kneeling at bedside; family at bedside    1148 [address] 8/90/0    1149 Dr. Rona Ulloa here     1150 patient sitting up for epidural    1156 time out    1158 catheter in; needle out    1159 test dose; fentanyl via handed to MD at bedside    1200 patient educated on epidural; med verified with RN; SVE 8/90/+1; patient positioned semi-schneider's with right pelvic hip tilt    1215 Bedside SBAR given to Lázaro Jaureugi RN and patient care relinquished

## 2018-09-13 NOTE — LACTATION NOTE
Infant is 35.5 weeks. Reviewed with mom potential issues with late pretermer. As infant latches and nurses well, only 4.5 hrs old and mom can express copious amounts of colostrum out, will hold off setting up a pump.

## 2018-09-13 NOTE — ADT AUTH CERT NOTES
Patient Demographics        Patient Name [address] Sex  Address Phone       Matt Mchugh [phone] Female 1995 [address] [id number] [phone] (Home) *Preferred*           CSN:       [id number]           Admit Date: Admit Time Room Bed       Sep 9, 2018 11:35  [65021] 01 [[id number]]           Attending Providers        Provider Pager From To       Steve Pillai MD  18       ADM   VAG DEL  BABY GIRL    Delivery Date: 2018   Delivery Time: 12:59 PM   Delivery Type: Vaginal, Spontaneous Delivery  Sex:  female    Gestational Age: 26w5d     [place] Measurements:  Birth Weight: 2.345 kg    Birth Length: 17\"          Delivery Clinician:  Roc Echeverria  Living?:   Delivery Location: L&D

## 2018-09-13 NOTE — PROGRESS NOTES
Bedside and Verbal shift change report given to CAMERON Shannon RN RN (oncoming nurse) by Vero Cain, RN   (offgoing nurse). Report given with SBAR and Kardex.

## 2018-09-13 NOTE — PROGRESS NOTES
Delivery Note    Obstetrician: lori    Assistant: none    Pre-Delivery Diagnosis:  pregnancy <37 weeks, Induced labor and Pregnancy complicated by: iugr    Post-Delivery Diagnosis: Female    Intrapartum Event: None    Procedure: Spontaneous vaginal delivery    Epidural: YES    Monitor:  Fetal Heart Tones - External and Uterine Contractions - External    Indications for instrumental delivery: none    Estimated Blood Loss:     Episiotomy: none    Laceration(s):  none    Laceration(s) repair: no    Presentation: Cephalic    Fetal Description: pinedo    Fetal Position: Left Occiput Anterior    Birth Weight:     Birth Length:     Apgar - One Minute: 9    Apgar - Five Minutes: 9    Umbilical Cord: 3 vessels present    Specimens: placenta           Complications:  none           Cord Blood Results: This patient has no babies on file. Prenatal Labs:     Lab Results   Component Value Date/Time    ABO/Rh(D) A POSITIVE 2018 01:10 AM    HBsAg, External negative 2018    HIV, External negative 2018    Rubella, External immune  2018    RPR, External nonreactive 2018    Gonorrhea, External negative  2018    Chlamydia, External negative 2018    GrBStrep, External Negative 2018        Attending Attestation: I was present and scrubbed for the entire procedure.  nicu in attendance for delivery as well    Signed By:  Jose Celaya MD     2018

## 2018-09-13 NOTE — PROGRESS NOTES
Dr Brian Albarado in room. Forebag ruptured with amnihook. Orders to antibiotics for prolonged rupture. IVF connected per Md order. Pt agrees with plan of care.

## 2018-09-13 NOTE — PROGRESS NOTES
1600 Received patient form L&D. Report received from at bedside. Assessment completed. Oriented patient to room, call bell included, reviewed admission folder and its contents. Informed of available pain medication. Instructed to call for increased pain, bleeding, large clots and when she needs to void. Patient verbalized understanding.

## 2018-09-13 NOTE — PROGRESS NOTES
Pt had srom clear fluid and is getting more uncomfortable on 12 m units of pitocin  fhr 140s with min variability but no decels  cx 3-4/100/high with forebag  Pt declines meds  Will increase pitocin as nurses are able. Unit is extremely busy.  Pt stable

## 2018-09-13 NOTE — ROUTINE PROCESS
1041:  This RN at bedside, patient has removed EFM and TOCO monitoring and states she does not want monitoring at this time. Reasoning for monitoring discussed with patient. Patient refused monitoring at this time. Pitocin induction stopped and explained reasoning for stopping to patient. Patient offered pain medication options, patient request RN to leave and let her think. 1055:  Patient request RN to bedside and asking questions regarding pain medication options. Patient request epidural. Fluid bolus started and patient understands need for monitoring prior to pain medication, and patient will agree to monitoring in a few minutes and agrees to restarting pitocin after her epidural.  Lanny Madrigal RN notified of patient's request for epidural and fluids bolusing at this time. 1119:  Patient states she feels like she needs to push. SVE by this RN 5/90/0.

## 2018-09-13 NOTE — ANESTHESIA PREPROCEDURE EVALUATION
Anesthetic History   No history of anesthetic complications            Review of Systems / Medical History  Patient summary reviewed, nursing notes reviewed and pertinent labs reviewed    Pulmonary  Within defined limits                 Neuro/Psych   Within defined limits           Cardiovascular                  Exercise tolerance: >4 METS     GI/Hepatic/Renal  Within defined limits              Endo/Other  Within defined limits           Other Findings              Physical Exam    Airway  Mallampati: II  TM Distance: 4 - 6 cm  Neck ROM: normal range of motion   Mouth opening: Normal     Cardiovascular  Regular rate and rhythm,  S1 and S2 normal,  no murmur, click, rub, or gallop             Dental  No notable dental hx       Pulmonary  Breath sounds clear to auscultation               Abdominal  GI exam deferred       Other Findings            Anesthetic Plan    ASA: 2  Anesthesia type: epidural  Risk and benefits fully explained to the patient including bleeding, headache, nerve damage, infection, nausea, back pain, and hemodynamic changes. Patient understands and agrees to the procedure.     Post-op pain plan if not by surgeon: indwelling epidural catheter      Anesthetic plan and risks discussed with: Patient

## 2018-09-13 NOTE — ANESTHESIA PROCEDURE NOTES
Epidural Block    Start time: 9/13/2018 11:53 AM  End time: 9/13/2018 12:00 PM  Performed by: Yesenia Garcia by: Tony Penaloza     Pre-Procedure  Indication: at surgeon's request, post-op pain management, procedure for pain, primary anesthetic and labor epidural    Preanesthetic Checklist: patient identified, risks and benefits discussed, anesthesia consent, site marked, patient being monitored, timeout performed and anesthesia consent      Epidural:   Patient position:  Seated  Prep region:  Lumbar  Prep: Chlorhexidine    Location:  L3-4    Needle and Epidural Catheter:   Needle Type:  Tuohy  Needle Gauge:  17 G  Injection Technique:  Loss of resistance using saline  Attempts:  1  Catheter Size:  20 G  Catheter at Skin Depth (cm):  11  Depth in Epidural Space (cm):  4  Events: no blood with aspiration, no cerebrospinal fluid with aspiration, no paresthesia and negative aspiration test    Test Dose:  Lidocaine 1.5% w/ epi    Assessment:   Catheter Secured:  Tegaderm and tape  Insertion:  Uncomplicated  Patient tolerance:  Patient tolerated the procedure well with no immediate complications

## 2018-09-14 LAB
APPEARANCE UR: CLEAR
BILIRUB UR QL: NEGATIVE
COLOR UR: ABNORMAL
GLUCOSE UR QL STRIP.AUTO: NEGATIVE MG/DL
HCT VFR BLD AUTO: 31 % (ref 35–45)
HGB BLD-MCNC: 9.7 G/DL (ref 12–16)
KETONES UR-MCNC: 15 MG/DL
LEUKOCYTE ESTERASE UR QL STRIP: ABNORMAL
NITRITE UR QL: NEGATIVE
PH UR: 7 [PH] (ref 5–9)
PROT UR QL: 30 MG/DL
RBC # UR STRIP: NEGATIVE /UL
SERVICE CMNT-IMP: ABNORMAL
SP GR UR: 1.02 (ref 1–1.02)
UROBILINOGEN UR QL: 0.2 EU/DL (ref 0.2–1)

## 2018-09-14 PROCEDURE — 36415 COLL VENOUS BLD VENIPUNCTURE: CPT | Performed by: OBSTETRICS & GYNECOLOGY

## 2018-09-14 PROCEDURE — 65270000029 HC RM PRIVATE

## 2018-09-14 PROCEDURE — 74011250637 HC RX REV CODE- 250/637: Performed by: OBSTETRICS & GYNECOLOGY

## 2018-09-14 PROCEDURE — 85014 HEMATOCRIT: CPT | Performed by: OBSTETRICS & GYNECOLOGY

## 2018-09-14 PROCEDURE — 85018 HEMOGLOBIN: CPT | Performed by: OBSTETRICS & GYNECOLOGY

## 2018-09-14 RX ADMIN — ACETAMINOPHEN 1000 MG: 500 TABLET, FILM COATED ORAL at 09:06

## 2018-09-14 NOTE — LACTATION NOTE
Infant latched and nursing well. Breastfeeding discharge teaching completed to include feeding on demand, foremilk and hindmilk importance, engorgement, mastitis, clogged ducts, pumping, breastmilk storage, and returning to work. Information given about breastfeeding support group and unit and office phone numbers provided and encouraged mom to reach out if concerns arise, but that [facility] would be calling her in the next few days to follow up on breastfeeding. Mom verbalized understanding and no questions at this time.

## 2018-09-14 NOTE — PROGRESS NOTES
Bedside and Verbal shift change report given to A Sample RN (oncoming nurse) by Dave Solomon RN   (offgoing nurse). Report given with SBAR and Kardex.

## 2018-09-14 NOTE — PROGRESS NOTES
1915 - Bedside and Verbal shift change report given to BEN Harding RN (oncoming nurse) by Bonnie Montoya RN (offgoing nurse). Report included the following information SBAR, Kardex, Intake/Output, MAR and Recent Results. 2005 - Assessment complete. POC discussed, questions and concerns addressed. Family at bedside. Call light within reach. 2315 - Bedside and Verbal shift change report given to SAMIA Garcia RN (oncoming nurse) by Bonnie Montoya RN (offgoing nurse). Report included the following information SBAR, Kardex, Intake/Output, MAR and Recent Results.

## 2018-09-14 NOTE — PROGRESS NOTES
Bedside and Verbal shift change report given to SILVANO Galdamez, RN (oncoming nurse) by SAMIA Garcia RN (offgoing nurse). Report included the following information SBAR, Kardex, Intake/Output, MAR and Recent Results. Bedside and Verbal shift change report given to BEN Rogers RN (oncoming nurse) by Patrick Bradshaw. Thelma Pendleton RN (offgoing nurse). Report included the following information SBAR, Kardex, Intake/Output, MAR and Recent Results.

## 2018-09-14 NOTE — ADVANCED PRACTICE NURSE
9/14/2018  11:48 AM    Laboring Epidural Follow-up Note     Referring physician: Irasema Valdovinos, *   Patient status post vaginal delivery with labor epidural    Visit Vitals    /60 (BP 1 Location: Left arm, BP Patient Position: At rest)    Pulse 89    Temp 36.9 °C (98.5 °F)    Resp 18    Ht 5' 2\" (1.575 m)    Wt 63.5 kg (140 lb)    LMP 01/05/2018    SpO2 99%    Breastfeeding Unknown    BMI 25.61 kg/m2                   Epidural removed by L&D staff  No sedation, pruritis noted. Adequate analgesia.   No obvious anesthesia complications          Gilda Pretzel May-Such, CRNA

## 2018-09-14 NOTE — PROGRESS NOTES
Progress Note    Patient: Gretel Mason MRN: [id number]  SSN: [id number]    YOB: 1995  Age: 23 y.o. Sex: female    ROOM:  [facility]/      Subjective:     Postpartum Day: 1            Delivery: vaginal delivery    The patient feels well. The patient has emotional concerns. The baby is well. Baby is feeding via breast.  The patient is ambulating well. The patient  tolerating a normal diet. Flatus has been passed. Objective:      Patient Vitals for the past 24 hrs:   BP Temp Pulse Resp SpO2   09/14/18 0641 103/60 98.5 °F (36.9 °C) 89 18 99 %   09/13/18 2005 127/62 99.6 °F (37.6 °C) (!) 107 18 100 %   09/13/18 1600 112/64 98.7 °F (37.1 °C) 99 18 -   09/13/18 1545 116/71 98.5 °F (36.9 °C) 99 18 -   09/13/18 1444 95/53 - 88 - -   09/13/18 1429 110/67 - 96 - -   09/13/18 1414 117/54 - 94 - -   09/13/18 1359 109/67 - 92 - -   09/13/18 1344 102/63 - 85 - -   09/13/18 1329 104/71 - 99 - -   09/13/18 1314 118/62 - 99 - -   09/13/18 1244 126/77 - 94 - -   09/13/18 1230 117/82 - (!) 105 - -   09/13/18 1217 128/88 - (!) 102 - -   09/13/18 1216 136/72 - 93 - -   09/13/18 1214 144/83 - (!) 102 - -   09/13/18 1157 135/73 - 95 - -   09/13/18 1153 155/85 - (!) 114 - -   09/13/18 0742 102/50 98 °F (36.7 °C) 86 14 -     Lochia:  appropriate   Uterine Fundus:   firm   Fundus Location:  -3   Incision:      DVT Evaluation:  No evidence of DVT seen on physical exam.  Negative Billie's sign. No cords or calf tenderness. No significant calf/ankle edema. Lab/Data Review: All lab results for the last 24 hours reviewed. LABS: Recent Results (from the past 48 hour(s))   CBC WITH AUTOMATED DIFF    Collection Time: 09/13/18  1:10 AM   Result Value Ref Range    WBC 16.0 (H) 4.6 - 13.2 K/uL    RBC 4.50 4. 20 - 5.30 M/uL    HGB 10.5 (L) 12.0 - 16.0 g/dL    HCT 33.7 (L) 35.0 - 45.0 %    MCV 74.9 74.0 - 97.0 FL    MCH 23.3 (L) 24.0 - 34.0 PG    MCHC 31.2 31.0 - 37.0 g/dL    RDW 17.0 (H) 11.6 - 14.5 %    PLATELET 867 762 - 806 K/uL    MPV 10.4 9.2 - 11.8 FL    NEUTROPHILS 77 (H) 40 - 73 %    LYMPHOCYTES 15 (L) 21 - 52 %    MONOCYTES 8 3 - 10 %    EOSINOPHILS 0 0 - 5 %    BASOPHILS 0 0 - 2 %    ABS. NEUTROPHILS 12.4 (H) 1.8 - 8.0 K/UL    ABS. LYMPHOCYTES 2.3 0.9 - 3.6 K/UL    ABS. MONOCYTES 1.3 (H) 0.05 - 1.2 K/UL    ABS. EOSINOPHILS 0.0 0.0 - 0.4 K/UL    ABS. BASOPHILS 0.0 0.0 - 0.1 K/UL    DF AUTOMATED     TYPE & SCREEN    Collection Time: 09/13/18  1:10 AM   Result Value Ref Range    Crossmatch Expiration 09/16/2018     ABO/Rh(D) A POSITIVE     Antibody screen NEG    HEMATOCRIT    Collection Time: 09/14/18  2:48 AM   Result Value Ref Range    HCT 31.0 (L) 35.0 - 45.0 %   HEMOGLOBIN    Collection Time: 09/14/18  2:48 AM   Result Value Ref Range    HGB 9.7 (L) 12.0 - 16.0 g/dL        Assessment:     Status post: Doing well postpartum vaginal delivery     Plan:     Postpartum care discussed including diet, ambulation, and actvitiy restrictions. Discharge instructions and questions answered.        Signed By: Gokul Guerrero MD     September 14, 2018

## 2018-09-15 VITALS
HEIGHT: 62 IN | WEIGHT: 140 LBS | TEMPERATURE: 98.6 F | BODY MASS INDEX: 25.76 KG/M2 | HEART RATE: 97 BPM | OXYGEN SATURATION: 100 % | RESPIRATION RATE: 16 BRPM | DIASTOLIC BLOOD PRESSURE: 76 MMHG | SYSTOLIC BLOOD PRESSURE: 121 MMHG

## 2018-09-15 RX ORDER — IBUPROFEN 800 MG/1
800 TABLET ORAL
Qty: 60 TAB | Refills: 1 | Status: SHIPPED | OUTPATIENT
Start: 2018-09-15 | End: 2021-02-03

## 2018-09-15 NOTE — PROGRESS NOTES
Bedside and Verbal shift change report given to Nelson Justice RN (oncoming nurse) by SAMIA Garcia RN (offgoing nurse).  Report included the following information SBAR, Kardex, Intake/Output, MAR and Recent Results.

## 2018-09-15 NOTE — PROGRESS NOTES
0700 Bedside and Verbal shift change report given to George Dunbar RN   (oncoming nurse) by SAMIA Garcia RN (offgoing nurse). Report included the following information SBAR, Intake/Output, MAR and Recent Results.    Whiteboard updated

## 2018-09-15 NOTE — PROGRESS NOTES
Discharge instructions given with verbalized understanding. Emphasis placed on \"post birth warning signs\" and \"when to call the doctor\". Reminded patient that all written instructions are in white admission folder for review.

## 2018-09-15 NOTE — PROGRESS NOTES
Progress Note    Patient: Leyla Crawford MRN: [id number]  SSN: [id number]    YOB: 1995  Age: 23 y.o. Sex: female      Subjective:     Postpartum Day: 2 Vaginal Delivery    The patient is without complaints. The patient is ambulating well. The patient  tolerating a normal diet. Flatus has been passed. The baby is well. Objective:      Patient Vitals for the past 8 hrs:   BP Temp Pulse Resp SpO2   09/15/18 0810 121/76 98.6 °F (37 °C) 97 16 100 %     LABS: No results found for this or any previous visit (from the past 24 hour(s)). Lochia:  appropriate   Uterine Fundus:   firm -1     Lab/Data Review: All lab results for the last 24 hours reviewed. Assessment:     Status post: Doing well postpartum vaginal delivery day 2. Plan:     Continue day 2 post-vaginal delivery orders, discharge today. Postpartum care discussed including diet, ambulation, and actvitiy restrictions. Patient is breastfeeding. Continue pre-mary alice vitamins. Medications as per MRF and discharge instructions. . Follow-up in six weeks.       Signed By: Mark Anthony Gutierres CNM     September 15, 2018

## 2018-09-15 NOTE — LACTATION NOTE
Was set up with pump last night. Encouraged mom to continue breastfeeding and pumping after each feeding. Breastfeeding discharge teaching completed to include feeding on demand, foremilk and hindmilk importance, engorgement, mastitis, clogged ducts, pumping, breastmilk storage, and returning to work. Information given about breastfeeding support group and unit and office phone numbers provided and encouraged mom to reach out if concerns arise, but that [facility] would be calling her in the next few days to follow up on breastfeeding. Mom verbalized understanding and no questions at this time.

## 2018-10-07 LAB
ATRIAL RATE: 88 BPM
CALCULATED P AXIS, ECG09: 29 DEGREES
CALCULATED R AXIS, ECG10: 32 DEGREES
CALCULATED T AXIS, ECG11: 18 DEGREES
DIAGNOSIS, 93000: NORMAL
P-R INTERVAL, ECG05: 126 MS
Q-T INTERVAL, ECG07: 378 MS
QRS DURATION, ECG06: 76 MS
QTC CALCULATION (BEZET), ECG08: 457 MS
VENTRICULAR RATE, ECG03: 88 BPM

## 2021-02-01 PROBLEM — O09.30 LATE PRENATAL CARE: Status: RESOLVED | Noted: 2018-08-20 | Resolved: 2021-02-01

## 2021-02-01 PROBLEM — O09.892 HISTORY OF PRETERM DELIVERY, CURRENTLY PREGNANT IN SECOND TRIMESTER: Status: ACTIVE | Noted: 2021-02-01

## 2021-02-01 PROBLEM — O09.292 HISTORY OF IUGR (INTRAUTERINE GROWTH RETARDATION) AND STILLBIRTH, CURRENTLY PREGNANT, SECOND TRIMESTER: Status: ACTIVE | Noted: 2021-02-01

## 2021-02-01 PROBLEM — Z3A.35 PREGNANCY WITH 35 COMPLETED WEEKS GESTATION: Status: RESOLVED | Noted: 2018-09-10 | Resolved: 2021-02-01

## 2021-02-01 PROBLEM — O30.049 DICHORIONIC DIAMNIOTIC TWIN PREGNANCY: Status: ACTIVE | Noted: 2021-02-01

## 2021-02-01 PROBLEM — O09.92 SUPERVISION OF HIGH RISK PREGNANCY IN SECOND TRIMESTER: Status: ACTIVE | Noted: 2021-02-01

## 2021-02-01 PROBLEM — O99.013 ANEMIA DURING PREGNANCY IN THIRD TRIMESTER: Status: RESOLVED | Noted: 2018-09-10 | Resolved: 2021-02-01

## 2021-02-01 PROBLEM — Z33.1 IUP (INTRAUTERINE PREGNANCY), INCIDENTAL: Status: RESOLVED | Noted: 2018-09-10 | Resolved: 2021-02-01

## 2021-02-01 PROBLEM — O36.5931 IUGR (INTRAUTERINE GROWTH RESTRICTION) AFFECTING CARE OF MOTHER, THIRD TRIMESTER, FETUS 1: Status: RESOLVED | Noted: 2018-09-07 | Resolved: 2021-02-01

## 2021-02-01 PROBLEM — Z3A.35 35 WEEKS GESTATION OF PREGNANCY: Status: RESOLVED | Noted: 2018-09-07 | Resolved: 2021-02-01

## 2021-02-03 PROBLEM — O09.892 HISTORY OF PRETERM DELIVERY, CURRENTLY PREGNANT IN SECOND TRIMESTER: Chronic | Status: ACTIVE | Noted: 2021-02-01

## 2021-02-03 PROBLEM — Z87.59 HISTORY OF PRIOR PREGNANCY WITH IUGR NEWBORN: Status: ACTIVE | Noted: 2021-02-01

## 2021-02-03 LAB
CHLAMYDIA, EXTERNAL: NEGATIVE
HBSAG, EXTERNAL: NEGATIVE
HIV, EXTERNAL: NON REACTIVE
N. GONORRHEA, EXTERNAL: NEGATIVE
RPR, EXTERNAL: NON REACTIVE
RUBELLA, EXTERNAL: NORMAL

## 2021-02-08 PROBLEM — R79.89 LOW TSH LEVEL: Status: ACTIVE | Noted: 2021-02-08

## 2021-02-08 PROBLEM — O09.899 MATERNAL VARICELLA, NON-IMMUNE: Status: ACTIVE | Noted: 2021-02-08

## 2021-02-08 PROBLEM — Z28.39 MATERNAL VARICELLA, NON-IMMUNE: Status: ACTIVE | Noted: 2021-02-08

## 2021-02-08 PROBLEM — D56.3 BETA THALASSEMIA TRAIT: Status: ACTIVE | Noted: 2021-02-01

## 2021-02-08 PROBLEM — O99.012 ANEMIA AFFECTING PREGNANCY IN SECOND TRIMESTER: Status: ACTIVE | Noted: 2021-02-08

## 2021-03-04 PROBLEM — Z87.59 HISTORY OF PRIOR PREGNANCY WITH IUGR NEWBORN: Status: ACTIVE | Noted: 2018-09-07

## 2021-03-31 ENCOUNTER — HOSPITAL ENCOUNTER (EMERGENCY)
Age: 26
Discharge: ANOTHER HOSPITAL | End: 2021-03-31
Attending: OBSTETRICS & GYNECOLOGY | Admitting: STUDENT IN AN ORGANIZED HEALTH CARE EDUCATION/TRAINING PROGRAM
Payer: MEDICAID

## 2021-03-31 VITALS
HEIGHT: 62 IN | TEMPERATURE: 98.2 F | SYSTOLIC BLOOD PRESSURE: 131 MMHG | WEIGHT: 142 LBS | RESPIRATION RATE: 18 BRPM | HEART RATE: 95 BPM | DIASTOLIC BLOOD PRESSURE: 73 MMHG | BODY MASS INDEX: 26.13 KG/M2 | OXYGEN SATURATION: 100 %

## 2021-03-31 LAB
ABO + RH BLD: NORMAL
APPEARANCE UR: CLEAR
BASOPHILS # BLD: 0 K/UL (ref 0–0.1)
BASOPHILS NFR BLD: 0 % (ref 0–2)
BILIRUB UR QL: NEGATIVE
BLOOD GROUP ANTIBODIES SERPL: NORMAL
COLOR UR: ABNORMAL
DIFFERENTIAL METHOD BLD: ABNORMAL
EOSINOPHIL # BLD: 0.1 K/UL (ref 0–0.4)
EOSINOPHIL NFR BLD: 1 % (ref 0–5)
ERYTHROCYTE [DISTWIDTH] IN BLOOD BY AUTOMATED COUNT: 15.4 % (ref 11.6–14.5)
GLUCOSE UR QL STRIP.AUTO: NEGATIVE MG/DL
HCT VFR BLD AUTO: 30.6 % (ref 35–45)
HGB BLD-MCNC: 9.6 G/DL (ref 12–16)
KETONES UR-MCNC: NEGATIVE MG/DL
LEUKOCYTE ESTERASE UR QL STRIP: ABNORMAL
LYMPHOCYTES # BLD: 1.9 K/UL (ref 0.9–3.6)
LYMPHOCYTES NFR BLD: 19 % (ref 21–52)
MAGNESIUM SERPL-MCNC: 2.1 MG/DL (ref 1.6–2.6)
MCH RBC QN AUTO: 24.4 PG (ref 24–34)
MCHC RBC AUTO-ENTMCNC: 31.4 G/DL (ref 31–37)
MCV RBC AUTO: 77.7 FL (ref 74–97)
MONOCYTES # BLD: 0.7 K/UL (ref 0.05–1.2)
MONOCYTES NFR BLD: 7 % (ref 3–10)
NEUTS SEG # BLD: 7.5 K/UL (ref 1.8–8)
NEUTS SEG NFR BLD: 73 % (ref 40–73)
NITRITE UR QL: NEGATIVE
PH UR: 7 [PH] (ref 5–9)
PLATELET # BLD AUTO: 190 K/UL (ref 135–420)
PMV BLD AUTO: 10.5 FL (ref 9.2–11.8)
PROT UR QL: 30 MG/DL
RBC # BLD AUTO: 3.94 M/UL (ref 4.2–5.3)
RBC # UR STRIP: NEGATIVE /UL
SERVICE CMNT-IMP: ABNORMAL
SP GR UR: 1.02 (ref 1–1.02)
SPECIMEN EXP DATE BLD: NORMAL
UROBILINOGEN UR QL: 0.2 EU/DL (ref 0.2–1)
WBC # BLD AUTO: 10.2 K/UL (ref 4.6–13.2)

## 2021-03-31 PROCEDURE — 77030040830 HC CATH URETH FOL MDII -A

## 2021-03-31 PROCEDURE — 74011000258 HC RX REV CODE- 258: Performed by: STUDENT IN AN ORGANIZED HEALTH CARE EDUCATION/TRAINING PROGRAM

## 2021-03-31 PROCEDURE — 87081 CULTURE SCREEN ONLY: CPT

## 2021-03-31 PROCEDURE — 87147 CULTURE TYPE IMMUNOLOGIC: CPT

## 2021-03-31 PROCEDURE — 81003 URINALYSIS AUTO W/O SCOPE: CPT

## 2021-03-31 PROCEDURE — 85025 COMPLETE CBC W/AUTO DIFF WBC: CPT

## 2021-03-31 PROCEDURE — 99285 EMERGENCY DEPT VISIT HI MDM: CPT

## 2021-03-31 PROCEDURE — 83735 ASSAY OF MAGNESIUM: CPT

## 2021-03-31 PROCEDURE — 87491 CHLMYD TRACH DNA AMP PROBE: CPT

## 2021-03-31 PROCEDURE — 86901 BLOOD TYPING SEROLOGIC RH(D): CPT

## 2021-03-31 PROCEDURE — 87086 URINE CULTURE/COLONY COUNT: CPT

## 2021-03-31 PROCEDURE — 96372 THER/PROPH/DIAG INJ SC/IM: CPT

## 2021-03-31 PROCEDURE — 87591 N.GONORRHOEAE DNA AMP PROB: CPT

## 2021-03-31 PROCEDURE — 96365 THER/PROPH/DIAG IV INF INIT: CPT

## 2021-03-31 PROCEDURE — 96375 TX/PRO/DX INJ NEW DRUG ADDON: CPT

## 2021-03-31 PROCEDURE — 87109 MYCOPLASMA: CPT

## 2021-03-31 PROCEDURE — 74011250637 HC RX REV CODE- 250/637: Performed by: STUDENT IN AN ORGANIZED HEALTH CARE EDUCATION/TRAINING PROGRAM

## 2021-03-31 PROCEDURE — 74011250636 HC RX REV CODE- 250/636: Performed by: STUDENT IN AN ORGANIZED HEALTH CARE EDUCATION/TRAINING PROGRAM

## 2021-03-31 PROCEDURE — 96376 TX/PRO/DX INJ SAME DRUG ADON: CPT

## 2021-03-31 PROCEDURE — 76815 OB US LIMITED FETUS(S): CPT

## 2021-03-31 RX ORDER — SODIUM CHLORIDE 0.9 % (FLUSH) 0.9 %
5-40 SYRINGE (ML) INJECTION AS NEEDED
Status: DISCONTINUED | OUTPATIENT
Start: 2021-03-31 | End: 2021-03-31 | Stop reason: HOSPADM

## 2021-03-31 RX ORDER — MAGNESIUM SULFATE HEPTAHYDRATE 40 MG/ML
2 INJECTION, SOLUTION INTRAVENOUS ONCE
Status: COMPLETED | OUTPATIENT
Start: 2021-03-31 | End: 2021-03-31

## 2021-03-31 RX ORDER — SODIUM CHLORIDE 0.9 % (FLUSH) 0.9 %
5-40 SYRINGE (ML) INJECTION EVERY 8 HOURS
Status: DISCONTINUED | OUTPATIENT
Start: 2021-03-31 | End: 2021-03-31 | Stop reason: HOSPADM

## 2021-03-31 RX ORDER — BETAMETHASONE SODIUM PHOSPHATE AND BETAMETHASONE ACETATE 3; 3 MG/ML; MG/ML
12 INJECTION, SUSPENSION INTRA-ARTICULAR; INTRALESIONAL; INTRAMUSCULAR; SOFT TISSUE EVERY 24 HOURS
Status: DISCONTINUED | OUTPATIENT
Start: 2021-03-31 | End: 2021-03-31 | Stop reason: HOSPADM

## 2021-03-31 RX ORDER — NIFEDIPINE 10 MG/1
10 CAPSULE ORAL EVERY 4 HOURS
Status: DISCONTINUED | OUTPATIENT
Start: 2021-03-31 | End: 2021-03-31 | Stop reason: HOSPADM

## 2021-03-31 RX ORDER — MAGNESIUM SULFATE HEPTAHYDRATE 40 MG/ML
4 INJECTION, SOLUTION INTRAVENOUS ONCE
Status: COMPLETED | OUTPATIENT
Start: 2021-03-31 | End: 2021-03-31

## 2021-03-31 RX ORDER — MAGNESIUM SULFATE HEPTAHYDRATE 40 MG/ML
2 INJECTION, SOLUTION INTRAVENOUS CONTINUOUS
Status: DISCONTINUED | OUTPATIENT
Start: 2021-03-31 | End: 2021-03-31 | Stop reason: HOSPADM

## 2021-03-31 RX ORDER — MAGNESIUM SULFATE HEPTAHYDRATE 40 MG/ML
2 INJECTION, SOLUTION INTRAVENOUS ONCE
Status: DISCONTINUED | OUTPATIENT
Start: 2021-03-31 | End: 2021-03-31

## 2021-03-31 RX ADMIN — BETAMETHASONE ACETATE AND BETAMETHASONE SODIUM PHOSPHATE 12 MG: 3; 3 INJECTION, SUSPENSION INTRA-ARTICULAR; INTRALESIONAL; INTRAMUSCULAR; SOFT TISSUE at 18:18

## 2021-03-31 RX ADMIN — MAGNESIUM SULFATE HEPTAHYDRATE 2 G: 40 INJECTION, SOLUTION INTRAVENOUS at 18:40

## 2021-03-31 RX ADMIN — NIFEDIPINE 10 MG: 10 CAPSULE ORAL at 18:59

## 2021-03-31 RX ADMIN — MAGNESIUM SULFATE IN WATER 2 G/HR: 40 INJECTION, SOLUTION INTRAVENOUS at 18:57

## 2021-03-31 RX ADMIN — SODIUM CHLORIDE 5 MILLION UNITS: 900 INJECTION INTRAVENOUS at 18:58

## 2021-03-31 RX ADMIN — MAGNESIUM SULFATE 4 G: 4 INJECTION INTRAVENOUS at 18:22

## 2021-03-31 NOTE — H&P
Ostetrical History and Physical    Subjective:     Date of Admission: 3/31/2021    Patient is a 23 y.o.   female admitted with labor at 18w2d (dating confirmed). Pregnancy complicated by Di/di TIUP, subclinical hyperthyroidism, hx iatrogenic PTD for IUGR at 35 weeks. She has been cramping with back pain for about a week and was evaluated in the office today and found to be 3cm. She reports increased discharge but no LOF. Reports GFM. Denies bleeding    For Obstetric history, see prenantal.    For Review of Systems, see prenatal    Past Medical History:   Diagnosis Date         Anemia     Anemia during pregnancy in third trimester     Beta thalassemia trait 2021    Chlamydia     History of group B Streptococcus (GBS) infection     History of  delivery     HPV (human papilloma virus) infection     IUGR (intrauterine growth restriction) affecting care of mother, third trimester, fetus 1 2018  EFW 6%., 2076 grams, 4 lb. 9 oz., Doppler umbilical artery wnl.  Kidney infection       History reviewed. No pertinent surgical history. Prior to Admission medications    Medication Sig Start Date End Date Taking? Authorizing Provider   aspirin delayed-release 81 mg tablet Take 1 Tab by mouth daily. 2/3/21   Yumiko Ziegler PA   folic acid (FOLVITE) 1 mg tablet Take 1 Tab by mouth daily. 2/3/21   Yumiko Ziegler PA   ferrous sulfate (IRON) 325 mg (65 mg iron) EC tablet Take 1 Tab by mouth every other day. 2/3/21   Yumiko Ziegler PA   PRENATAL VIT CALC,IRON,FOLIC (PRENATAL VITAMIN PO) Take  by mouth.     Other, MD Wesley     No Known Allergies   Social History     Tobacco Use    Smoking status: Never Smoker    Smokeless tobacco: Never Used   Substance Use Topics    Alcohol use: No     Alcohol/week: 0.0 standard drinks      Family History   Problem Relation Age of Onset    Lupus Mother     Diabetes Father     Cancer Neg Hx           Objective:     Blood pressure 117/61, pulse (!) 104, temperature 98 °F (36.7 °C), resp. rate 17, height 5' 2\" (1.575 m), weight 64.4 kg (142 lb), last menstrual period 10/14/2020, SpO2 98 %, not currently breastfeeding. Temp (24hrs), Av.3 °F (36.3 °C), Min:96.6 °F (35.9 °C), Max:98 °F (36.7 °C)        No intake/output data recorded. No intake/output data recorded. HEENT: No pallor, no jaundice, Thyroid and throat normal  RESPIRATORY: Clear to A & P  CVS: pulse reg, HS normal  ABDOMEN: Gravid. A vertex B transverse. EFW ~500g each based on my bedside ultrasound. Normal fluid.  No abnormal tenderness to palpation  Pelvic: Cervix 3,   Effaced: 50%  Station:  -3; BBOW without palpable presenting part  Data Review:   Recent Results (from the past 24 hour(s))   AMB POC URINALYSIS DIP STICK MANUAL W/O MICRO    Collection Time: 21  3:48 PM   Result Value Ref Range    Color (UA POC) Yellow     Clarity (UA POC) Clear     Glucose (UA POC) Negative Negative    Bilirubin (UA POC) Negative Negative    Ketones (UA POC) Negative Negative    Specific gravity (UA POC) 1.010 1.001 - 1.035    Blood (UA POC) Negative Negative    pH (UA POC) 7.5 4.6 - 8.0    Protein (UA POC) Trace Negative    Urobilinogen (UA POC) normal 0.2 - 1    Nitrites (UA POC) Negative Negative    Leukocyte esterase (UA POC) 1+ Negative   POC URINE MACROSCOPIC    Collection Time: 21  5:42 PM   Result Value Ref Range    Color OTHER      Appearance CLEAR      Spec. gravity (POC) 1.020 1.001 - 1.023      pH, urine  (POC) 7.0 5.0 - 9.0      Protein (POC) 30 (A) NEG mg/dL    Glucose, urine (POC) Negative NEG mg/dL    Ketones (POC) Negative NEG mg/dL    Bilirubin (POC) Negative NEG      Blood (POC) Negative NEG      Urobilinogen (POC) 0.2 0.2 - 1.0 EU/dL    Nitrite (POC) Negative NEG      Leukocyte esterase (POC) SMALL (A) NEG      Performed by Ludie Sires White    CBC WITH AUTOMATED DIFF    Collection Time: 21  6:05 PM   Result Value Ref Range    WBC 10.2 4.6 - 13.2 K/uL RBC 3.94 (L) 4.20 - 5.30 M/uL    HGB 9.6 (L) 12.0 - 16.0 g/dL    HCT 30.6 (L) 35.0 - 45.0 %    MCV 77.7 74.0 - 97.0 FL    MCH 24.4 24.0 - 34.0 PG    MCHC 31.4 31.0 - 37.0 g/dL    RDW 15.4 (H) 11.6 - 14.5 %    PLATELET 256 703 - 672 K/uL    MPV 10.5 9.2 - 11.8 FL    NEUTROPHILS 73 40 - 73 %    LYMPHOCYTES 19 (L) 21 - 52 %    MONOCYTES 7 3 - 10 %    EOSINOPHILS 1 0 - 5 %    BASOPHILS 0 0 - 2 %    ABS. NEUTROPHILS 7.5 1.8 - 8.0 K/UL    ABS. LYMPHOCYTES 1.9 0.9 - 3.6 K/UL    ABS. MONOCYTES 0.7 0.05 - 1.2 K/UL    ABS. EOSINOPHILS 0.1 0.0 - 0.4 K/UL    ABS. BASOPHILS 0.0 0.0 - 0.1 K/UL    DF AUTOMATED     MAGNESIUM    Collection Time: 21  6:05 PM   Result Value Ref Range    Magnesium 2.1 1.6 - 2.6 mg/dL     Monitor:  Nothing on toco; FHT x2 normal 150s    Assessment:     Active Problems:    Dichorionic diamniotic twin pregnancy (2021)      Overview: LMP= 10/14/2020, RK= 2021 based on early Ultrasound at [facility]. on       2020      Needs early 1-hr GTT- supplies given 2/3/2021      Referral to [facility]FM for morphology and consult MFM referral sent. Appt       3/19/21 at 1 pm, 1:45 and 2pm. Pt aware      History of  delivery, currently pregnant in second trimester (2021)      Overview: Induced at 35w6d in 2018-   Hx of IUGR      History of prior pregnancy with IUGR  (2018)      Overview: IUGR 2018            18  EFW 6%., 2076 grams, 4 lb. 9 oz., Doppler umbilical artery wnl. Beta thalassemia trait (2021)      Overview: [facility] referral to genetic counseling? Keiko Ritter Referral sent. Appt 3/4/21. Pt aware      Low TSH level (2021)      Overview: 2/3/2021- TSH= 0.04. T3, T4, and TSI labs ordered 2/10/2021        Plan:   [facility] will not offer resuscitation <23 weeks here; however, other hospitals are offering resuscitation at 22 weeks.   Discuss survival at this age and there is a high rate of \"damaged survivor\" and possible need for urgent  section  She expresses a desires to do everything possible at this time. , LESLY, procardia PCN ordered  Cultures collected  Discussed case with M will plan transfer to [facility] for further management    Type of admit:In-Patient    I saw and examined patient.     Signed By: Sahara Romero MD                         March 31, 2021

## 2021-03-31 NOTE — PROGRESS NOTES
1920 Bedside and verbal report received  CORI Parr RN, care assumed at this time. 2032 Pt left L&D unit via transport with [facility]. Care relinquished at this time. 2040 [facility] and Delivery unit notified that pt has left [facility] via transport through [facility]. This RN spoke to unit secretary, to notify them that pt is on her way. Rikki Jaquez RN is no longer on duty. No changes since last report given to Rikki Jaquez RN.
200  with DI DI twins arrives to unit from office for  labor. Pt reports contractions have gotten a littler better and are occurring about every 10 minutes. Pt taken to labor room 8.     1700 Dr. Ilan Guo at bedside discussing plan of care. 1800 SVE: 3/50/bulding bag. Ultrasound preformed.  First dose of celestone given per [facility].     1822 Magnesium bolus started. 0 Report called to Chloe Lambert RN at 4400 [facility] Bedside and verbal report given to Chacha Ortiz RN.
GBS, ureaplamsa, GC/Cl swabs done, and sent to lab, urine culture obtained and sent to lab
Include Location In Plan?: Yes
Detail Level: Zone
Detail Level: Simple

## 2021-04-02 LAB
BACTERIA SPEC CULT: NORMAL
CC UR VC: NORMAL
SERVICE CMNT-IMP: NORMAL

## 2021-04-03 PROBLEM — O60.02 PRETERM LABOR IN SECOND TRIMESTER WITHOUT DELIVERY: Status: ACTIVE | Noted: 2021-04-03

## 2021-04-03 LAB
BACTERIA SPEC CULT: ABNORMAL
C TRACH RRNA SPEC QL NAA+PROBE: NEGATIVE
N GONORRHOEA RRNA SPEC QL NAA+PROBE: NEGATIVE
PLEASE NOTE:, 188601: NORMAL
SERVICE CMNT-IMP: ABNORMAL
SPECIMEN SOURCE: NORMAL

## 2021-04-05 LAB
M HOMINIS SPEC QL CULT: POSITIVE
SPECIMEN SOURCE: ABNORMAL
U UREALYTICUM SPEC QL CULT: POSITIVE

## 2022-02-03 ENCOUNTER — HOSPITAL ENCOUNTER (EMERGENCY)
Age: 27
Discharge: HOME | End: 2022-02-03
Attending: EMERGENCY MEDICINE
Payer: MEDICAID

## 2022-02-03 VITALS
HEART RATE: 119 BPM | SYSTOLIC BLOOD PRESSURE: 127 MMHG | WEIGHT: 142 LBS | RESPIRATION RATE: 16 BRPM | HEIGHT: 62 IN | TEMPERATURE: 98.3 F | OXYGEN SATURATION: 99 % | DIASTOLIC BLOOD PRESSURE: 81 MMHG | BODY MASS INDEX: 26.13 KG/M2

## 2022-02-03 LAB
ALBUMIN SERPL-MCNC: 3.9 G/DL (ref 3.5–5)
ALBUMIN/GLOB SERPL: 1 {RATIO} (ref 1.1–2.2)
ALP SERPL-CCNC: 48 U/L (ref 45–117)
ALT SERPL-CCNC: 15 U/L (ref 12–78)
AMPHET UR QL SCN: NEGATIVE
ANION GAP SERPL CALC-SCNC: 3 MMOL/L (ref 5–15)
APPEARANCE UR: ABNORMAL
AST SERPL W P-5'-P-CCNC: 9 U/L (ref 15–37)
BACTERIA URNS QL MICRO: NEGATIVE /HPF
BARBITURATES UR QL SCN: NEGATIVE
BASOPHILS # BLD: 0.1 K/UL (ref 0–0.1)
BASOPHILS NFR BLD: 1 % (ref 0–1)
BENZODIAZ UR QL: NEGATIVE
BILIRUB SERPL-MCNC: 0.2 MG/DL (ref 0.2–1)
BILIRUB UR QL: NEGATIVE
BUN SERPL-MCNC: 9 MG/DL (ref 6–20)
BUN/CREAT SERPL: 14 (ref 12–20)
CA-I BLD-MCNC: 9.7 MG/DL (ref 8.5–10.1)
CANNABINOIDS UR QL SCN: NEGATIVE
CHLORIDE SERPL-SCNC: 105 MMOL/L (ref 97–108)
CO2 SERPL-SCNC: 29 MMOL/L (ref 21–32)
COCAINE UR QL SCN: NEGATIVE
COLOR UR: ABNORMAL
CREAT SERPL-MCNC: 0.66 MG/DL (ref 0.55–1.02)
DIFFERENTIAL METHOD BLD: ABNORMAL
DRUG SCRN COMMENT,DRGCM: NORMAL
EOSINOPHIL # BLD: 0.1 K/UL (ref 0–0.4)
EOSINOPHIL NFR BLD: 2 % (ref 0–7)
ERYTHROCYTE [DISTWIDTH] IN BLOOD BY AUTOMATED COUNT: 17.5 % (ref 11.5–14.5)
GLOBULIN SER CALC-MCNC: 3.8 G/DL (ref 2–4)
GLUCOSE SERPL-MCNC: 86 MG/DL (ref 65–100)
GLUCOSE UR STRIP.AUTO-MCNC: NEGATIVE MG/DL
HCG UR QL: NEGATIVE
HCT VFR BLD AUTO: 43.6 % (ref 35–47)
HGB BLD-MCNC: 13.7 G/DL (ref 11.5–16)
HGB UR QL STRIP: NEGATIVE
IMM GRANULOCYTES # BLD AUTO: 0 K/UL (ref 0–0.04)
IMM GRANULOCYTES NFR BLD AUTO: 0 % (ref 0–0.5)
KETONES UR QL STRIP.AUTO: NEGATIVE MG/DL
LEUKOCYTE ESTERASE UR QL STRIP.AUTO: NEGATIVE
LYMPHOCYTES # BLD: 1.4 K/UL (ref 0.8–3.5)
LYMPHOCYTES NFR BLD: 27 % (ref 12–49)
MCH RBC QN AUTO: 23.8 PG (ref 26–34)
MCHC RBC AUTO-ENTMCNC: 31.4 G/DL (ref 30–36.5)
MCV RBC AUTO: 75.8 FL (ref 80–99)
METHADONE UR QL: NEGATIVE
MONOCYTES # BLD: 0.4 K/UL (ref 0–1)
MONOCYTES NFR BLD: 7 % (ref 5–13)
MUCOUS THREADS URNS QL MICRO: ABNORMAL /LPF
NEUTS SEG # BLD: 3.2 K/UL (ref 1.8–8)
NEUTS SEG NFR BLD: 63 % (ref 32–75)
NITRITE UR QL STRIP.AUTO: NEGATIVE
NRBC # BLD: 0 K/UL (ref 0–0.01)
NRBC BLD-RTO: 0 PER 100 WBC
OPIATES UR QL: NEGATIVE
PCP UR QL: NEGATIVE
PH UR STRIP: 8 [PH] (ref 5–8)
PLATELET # BLD AUTO: 256 K/UL (ref 150–400)
PMV BLD AUTO: 10.9 FL (ref 8.9–12.9)
POTASSIUM SERPL-SCNC: 4.3 MMOL/L (ref 3.5–5.1)
PROT SERPL-MCNC: 7.7 G/DL (ref 6.4–8.2)
PROT UR STRIP-MCNC: NEGATIVE MG/DL
RBC # BLD AUTO: 5.75 M/UL (ref 3.8–5.2)
RBC #/AREA URNS HPF: ABNORMAL /HPF (ref 0–5)
SODIUM SERPL-SCNC: 137 MMOL/L (ref 136–145)
SP GR UR REFRACTOMETRY: 1.02 (ref 1–1.03)
UROBILINOGEN UR QL STRIP.AUTO: 0.1 EU/DL (ref 0.1–1)
WBC # BLD AUTO: 5.1 K/UL (ref 3.6–11)
WBC URNS QL MICRO: ABNORMAL /HPF (ref 0–4)

## 2022-02-03 PROCEDURE — 36415 COLL VENOUS BLD VENIPUNCTURE: CPT

## 2022-02-03 PROCEDURE — 81025 URINE PREGNANCY TEST: CPT

## 2022-02-03 PROCEDURE — 80053 COMPREHEN METABOLIC PANEL: CPT

## 2022-02-03 PROCEDURE — 99283 EMERGENCY DEPT VISIT LOW MDM: CPT

## 2022-02-03 PROCEDURE — 81001 URINALYSIS AUTO W/SCOPE: CPT

## 2022-02-03 PROCEDURE — 80307 DRUG TEST PRSMV CHEM ANLYZR: CPT

## 2022-02-03 PROCEDURE — 85025 COMPLETE CBC W/AUTO DIFF WBC: CPT

## 2022-02-03 NOTE — ED TRIAGE NOTES
GCS 15 pt stated that she has been feeling very overwhelmed for a while; pt stated that she has been feeling very fatigued; denies SI/HI, stated that she just had twins in the early part of last year and lost her grandmother last year as well

## 2022-02-03 NOTE — ED PROVIDER NOTES
EMERGENCY DEPARTMENT HISTORY AND PHYSICAL EXAM      Date: 2/3/2022  Patient Name: Elisha Morales    History of Presenting Illness     Chief Complaint   Patient presents with    Depression    Palpitations       History Provided By: Patient    HPI: Elisha Morales, 27 y.o. female with a past medical history significant No significant past medical history presents to the ED with cc of palpitations and feeling overwhelmed over the past couple of weeks. She says she did suffer from postpartum depression after having her daughters but it waxes and wanes without exacerbating or relieving factors has not treated with anything. She has been able to follow-up with psychiatry. There are no other complaints, changes, or physical findings at this time. PCP: Elysia Salazar MD    No current facility-administered medications on file prior to encounter. Current Outpatient Medications on File Prior to Encounter   Medication Sig Dispense Refill    aspirin delayed-release 81 mg tablet Take 1 Tab by mouth daily. 90 Tab 2    folic acid (FOLVITE) 1 mg tablet Take 1 Tab by mouth daily. 90 Tab 3    ferrous sulfate (IRON) 325 mg (65 mg iron) EC tablet Take 1 Tab by mouth every other day. 60 Tab 3    PRENATAL VIT CALC,IRON,FOLIC (PRENATAL VITAMIN PO) Take  by mouth. Past History     Past Medical History:  Past Medical History:   Diagnosis Date         Anemia     Anemia during pregnancy in third trimester     Beta thalassemia trait 2021    Chlamydia     History of group B Streptococcus (GBS) infection     History of  delivery     HPV (human papilloma virus) infection     IUGR (intrauterine growth restriction) affecting care of mother, third trimester, fetus 1 2018  EFW 6%., 2076 grams, 4 lb. 9 oz., Doppler umbilical artery wnl.  Kidney infection        Past Surgical History:  No past surgical history on file.     Family History:  Family History   Problem Relation Age of Onset    Lupus Mother     Diabetes Father     Cancer Neg Hx        Social History:  Social History     Tobacco Use    Smoking status: Never Smoker    Smokeless tobacco: Never Used   Substance Use Topics    Alcohol use: No     Alcohol/week: 0.0 standard drinks    Drug use: No       Allergies:  No Known Allergies      Review of Systems     Review of Systems   Constitutional: Negative. Negative for appetite change, chills, fatigue and fever. HENT: Negative. Negative for congestion and sinus pain. Eyes: Negative. Negative for pain and visual disturbance. Respiratory: Negative. Negative for chest tightness and shortness of breath. Cardiovascular: Positive for palpitations. Negative for chest pain. Gastrointestinal: Negative. Negative for abdominal pain, diarrhea, nausea and vomiting. Genitourinary: Negative. Negative for difficulty urinating. No discharge   Musculoskeletal: Negative. Negative for arthralgias. Skin: Negative. Negative for rash. Neurological: Negative. Negative for weakness and headaches. Hematological: Negative. Psychiatric/Behavioral: Negative. Negative for agitation. The patient is not nervous/anxious. All other systems reviewed and are negative. Physical Exam     Physical Exam  Vitals and nursing note reviewed. Constitutional:       General: She is not in acute distress. Appearance: She is well-developed. HENT:      Head: Normocephalic and atraumatic. Nose: Nose normal.      Mouth/Throat:      Mouth: Mucous membranes are moist.      Pharynx: Oropharynx is clear. No oropharyngeal exudate. Eyes:      General:         Right eye: No discharge. Left eye: No discharge. Conjunctiva/sclera: Conjunctivae normal.      Pupils: Pupils are equal, round, and reactive to light. Cardiovascular:      Rate and Rhythm: Normal rate and regular rhythm. Chest Wall: PMI is not displaced. No thrill.       Heart sounds: Normal heart sounds. No murmur heard. No friction rub. No gallop. Pulmonary:      Effort: Pulmonary effort is normal. No respiratory distress. Breath sounds: Normal breath sounds. No wheezing or rales. Chest:      Chest wall: No tenderness. Abdominal:      General: Bowel sounds are normal. There is no distension. Palpations: Abdomen is soft. There is no mass. Tenderness: There is no abdominal tenderness. There is no guarding or rebound. Musculoskeletal:         General: Normal range of motion. Cervical back: Normal range of motion and neck supple. Lymphadenopathy:      Cervical: No cervical adenopathy. Skin:     General: Skin is warm and dry. Capillary Refill: Capillary refill takes less than 2 seconds. Findings: No erythema or rash. Neurological:      Mental Status: She is alert and oriented to person, place, and time. Cranial Nerves: No cranial nerve deficit. Coordination: Coordination normal.   Psychiatric:         Mood and Affect: Mood normal.         Behavior: Behavior normal.         Lab and Diagnostic Study Results     Labs -     Recent Results (from the past 12 hour(s))   CBC WITH AUTOMATED DIFF    Collection Time: 02/03/22  6:00 PM   Result Value Ref Range    WBC 5.1 3.6 - 11.0 K/uL    RBC 5.75 (H) 3.80 - 5.20 M/uL    HGB 13.7 11.5 - 16.0 g/dL    HCT 43.6 35.0 - 47.0 %    MCV 75.8 (L) 80.0 - 99.0 FL    MCH 23.8 (L) 26.0 - 34.0 PG    MCHC 31.4 30.0 - 36.5 g/dL    RDW 17.5 (H) 11.5 - 14.5 %    PLATELET 154 973 - 193 K/uL    MPV 10.9 8.9 - 12.9 FL    NRBC 0.0 0.0  WBC    ABSOLUTE NRBC 0.00 0.00 - 0.01 K/uL    NEUTROPHILS 63 32 - 75 %    LYMPHOCYTES 27 12 - 49 %    MONOCYTES 7 5 - 13 %    EOSINOPHILS 2 0 - 7 %    BASOPHILS 1 0 - 1 %    IMMATURE GRANULOCYTES 0 0 - 0.5 %    ABS. NEUTROPHILS 3.2 1.8 - 8.0 K/UL    ABS. LYMPHOCYTES 1.4 0.8 - 3.5 K/UL    ABS. MONOCYTES 0.4 0.0 - 1.0 K/UL    ABS. EOSINOPHILS 0.1 0.0 - 0.4 K/UL    ABS.  BASOPHILS 0.1 0.0 - 0.1 K/UL ABS. IMM. GRANS. 0.0 0.00 - 0.04 K/UL    DF AUTOMATED     METABOLIC PANEL, COMPREHENSIVE    Collection Time: 02/03/22  6:00 PM   Result Value Ref Range    Sodium 137 136 - 145 mmol/L    Potassium 4.3 3.5 - 5.1 mmol/L    Chloride 105 97 - 108 mmol/L    CO2 29 21 - 32 mmol/L    Anion gap 3 (L) 5 - 15 mmol/L    Glucose 86 65 - 100 mg/dL    BUN 9 6 - 20 mg/dL    Creatinine 0.66 0.55 - 1.02 mg/dL    BUN/Creatinine ratio 14 12 - 20      GFR est AA >60 >60 ml/min/1.73m2    GFR est non-AA >60 >60 ml/min/1.73m2    Calcium 9.7 8.5 - 10.1 mg/dL    Bilirubin, total 0.2 0.2 - 1.0 mg/dL    AST (SGOT) 9 (L) 15 - 37 U/L    ALT (SGPT) 15 12 - 78 U/L    Alk.  phosphatase 48 45 - 117 U/L    Protein, total 7.7 6.4 - 8.2 g/dL    Albumin 3.9 3.5 - 5.0 g/dL    Globulin 3.8 2.0 - 4.0 g/dL    A-G Ratio 1.0 (L) 1.1 - 2.2     HCG URINE, QL    Collection Time: 02/03/22  6:00 PM   Result Value Ref Range    HCG urine, QL Negative Negative     URINALYSIS W/MICROSCOPIC    Collection Time: 02/03/22  6:00 PM   Result Value Ref Range    Color Yellow/Straw      Appearance Turbid (A) Clear      Specific gravity 1.023 1.003 - 1.030      pH (UA) 8.0 5.0 - 8.0      Protein Negative Negative mg/dL    Glucose Negative Negative mg/dL    Ketone Negative Negative mg/dL    Bilirubin Negative Negative      Blood Negative Negative      Urobilinogen 0.1 0.1 - 1.0 EU/dL    Nitrites Negative Negative      Leukocyte Esterase Negative Negative      WBC 0-4 0 - 4 /hpf    RBC 0-5 0 - 5 /hpf    Bacteria Negative Negative /hpf    Mucus 3+ /lpf   DRUG SCREEN, URINE    Collection Time: 02/03/22  6:00 PM   Result Value Ref Range    AMPHETAMINES Negative Negative      BARBITURATES Negative Negative      BENZODIAZEPINES Negative Negative      COCAINE Negative Negative      METHADONE Negative Negative      OPIATES Negative Negative      PCP(PHENCYCLIDINE) Negative Negative      THC (TH-CANNABINOL) Negative Negative      Drug screen comment        This test is a screen for drugs of abuse in a medical setting only (i.e., they are unconfirmed results and as such must not be used for non-medical purposes, e.g.,employment testing, legal testing). Due to its inherent nature, false positive (FP) and false negative (FN) results may be obtained. Therefore, if necessary for medical care, recommend confirmation of positive findings by GC/MS. Radiologic Studies -   @[email or web address]@  CT Results  (Last 48 hours)    None        CXR Results  (Last 48 hours)    None            Medical Decision Making   - I am the first provider for this patient. - I reviewed the vital signs, available nursing notes, past medical history, past surgical history, family history and social history. - Initial assessment performed. The patients presenting problems have been discussed, and they are in agreement with the care plan formulated and outlined with them. I have encouraged them to ask questions as they arise throughout their visit. Vital Signs-Reviewed the patient's vital signs. Patient Vitals for the past 12 hrs:   Temp Pulse Resp BP SpO2   02/03/22 1757 -- -- -- -- 99 %   02/03/22 1701 98.3 °F (36.8 °C) (!) 119 16 127/81 99 %     Will provide medical clearance. She states that she has multiple medical stressors. ED Course:          Provider Notes (Medical Decision Making):   53-year-old female with postpartum depression. No suicidal or homicidal ideations and she has received resources as an outpatient  [facility]       Procedures   Medical Decision Makingedical Decision Making  Performed by: Charity Zazueta MD  PROCEDURES:  Procedures       Disposition   Disposition: Condition stable    Discharged    DISCHARGE PLAN:  1. Current Discharge Medication List      CONTINUE these medications which have NOT CHANGED    Details   aspirin delayed-release 81 mg tablet Take 1 Tab by mouth daily.   Qty: 90 Tab, Refills: 2    Associated Diagnoses: Dichorionic diamniotic twin pregnancy in second trimester      folic acid (FOLVITE) 1 mg tablet Take 1 Tab by mouth daily. Qty: 90 Tab, Refills: 3    Associated Diagnoses: Dichorionic diamniotic twin pregnancy in second trimester      ferrous sulfate (IRON) 325 mg (65 mg iron) EC tablet Take 1 Tab by mouth every other day. Qty: 60 Tab, Refills: 3    Associated Diagnoses: Dichorionic diamniotic twin pregnancy in second trimester      PRENATAL VIT CALC,IRON,FOLIC (PRENATAL VITAMIN PO) Take  by mouth. 2.   Follow-up Information     Follow up With Specialties Details Why Contact Info    Lexy Cantu MD Pediatric Medicine   [address] [facility] [address]  [phone]          3. Return to ED if worse   4. Current Discharge Medication List            Diagnosis     Clinical Impression:   1. Postpartum depression        Attestations:    Reyna Elder MD    Please note that this dictation was completed with [employer], the computer voice recognition software. Quite often unanticipated grammatical, syntax, homophones, and other interpretive errors are inadvertently transcribed by the computer software. Please disregard these errors. Please excuse any errors that have escaped final proofreading. Thank you.

## 2022-02-04 NOTE — BSMART NOTE
Post partum resources provided to patient per Dr Junior Luna request.  Pt appreciative and has no other concerns for this writer at this time

## 2022-03-18 PROBLEM — O09.899 MATERNAL VARICELLA, NON-IMMUNE: Status: ACTIVE | Noted: 2021-02-08

## 2022-03-18 PROBLEM — A74.9 CHLAMYDIA INFECTION AFFECTING PREGNANCY IN FIRST TRIMESTER: Status: ACTIVE | Noted: 2018-08-20

## 2022-03-18 PROBLEM — O30.049 DICHORIONIC DIAMNIOTIC TWIN PREGNANCY: Status: ACTIVE | Noted: 2021-02-01

## 2022-03-18 PROBLEM — D56.3 BETA THALASSEMIA TRAIT: Status: ACTIVE | Noted: 2021-02-01

## 2022-03-18 PROBLEM — O98.811 CHLAMYDIA INFECTION AFFECTING PREGNANCY IN FIRST TRIMESTER: Status: ACTIVE | Noted: 2018-08-20

## 2022-03-18 PROBLEM — O60.02 PRETERM LABOR IN SECOND TRIMESTER WITHOUT DELIVERY: Status: ACTIVE | Noted: 2021-04-03

## 2022-03-18 PROBLEM — Z28.39 MATERNAL VARICELLA, NON-IMMUNE: Status: ACTIVE | Noted: 2021-02-08

## 2022-03-18 PROBLEM — R79.89 LOW TSH LEVEL: Status: ACTIVE | Noted: 2021-02-08

## 2022-03-19 PROBLEM — O09.892 HISTORY OF PRETERM DELIVERY, CURRENTLY PREGNANT IN SECOND TRIMESTER: Status: ACTIVE | Noted: 2021-02-01

## 2022-03-19 PROBLEM — O99.012 ANEMIA AFFECTING PREGNANCY IN SECOND TRIMESTER: Status: ACTIVE | Noted: 2021-02-08

## 2022-03-19 PROBLEM — Z87.59 HISTORY OF PRIOR PREGNANCY WITH IUGR NEWBORN: Status: ACTIVE | Noted: 2018-09-07

## 2022-04-18 ENCOUNTER — HOSPITAL ENCOUNTER (EMERGENCY)
Age: 27
Discharge: HOME | End: 2022-04-19
Attending: STUDENT IN AN ORGANIZED HEALTH CARE EDUCATION/TRAINING PROGRAM
Payer: MEDICAID

## 2022-04-18 DIAGNOSIS — R10.9 ABDOMINAL CRAMPS: ICD-10-CM

## 2022-04-18 DIAGNOSIS — Z3A.09 9 WEEKS GESTATION OF PREGNANCY: Primary | ICD-10-CM

## 2022-04-18 DIAGNOSIS — N93.9 VAGINAL SPOTTING: ICD-10-CM

## 2022-04-18 LAB
APPEARANCE UR: CLEAR
BACTERIA URNS QL MICRO: NEGATIVE /HPF
BILIRUB UR QL: NEGATIVE
COLOR UR: NORMAL
GLUCOSE UR STRIP.AUTO-MCNC: NEGATIVE MG/DL
HCG SERPL-ACNC: ABNORMAL MIU/ML (ref 0–6)
HGB UR QL STRIP: NEGATIVE
KETONES UR QL STRIP.AUTO: NEGATIVE MG/DL
KOH PREP SPEC: NORMAL
LEUKOCYTE ESTERASE UR QL STRIP.AUTO: NEGATIVE
MUCOUS THREADS URNS QL MICRO: NORMAL /LPF
NITRITE UR QL STRIP.AUTO: NEGATIVE
PH UR STRIP: 6 [PH] (ref 5–8)
PROT UR STRIP-MCNC: NEGATIVE MG/DL
RBC #/AREA URNS HPF: NORMAL /HPF (ref 0–5)
SP GR UR REFRACTOMETRY: 1.02 (ref 1–1.03)
SPECIAL REQUESTS,SREQ: NORMAL
TRICHOMONAS RAPID AG, TRICR: NEGATIVE
UA: UC IF INDICATED,UAUC: NORMAL
UROBILINOGEN UR QL STRIP.AUTO: 0.1 EU/DL (ref 0.1–1)
WBC URNS QL MICRO: NORMAL /HPF (ref 0–4)

## 2022-04-18 PROCEDURE — 87808 TRICHOMONAS ASSAY W/OPTIC: CPT

## 2022-04-18 PROCEDURE — 84702 CHORIONIC GONADOTROPIN TEST: CPT

## 2022-04-18 PROCEDURE — 99283 EMERGENCY DEPT VISIT LOW MDM: CPT

## 2022-04-18 PROCEDURE — 87210 SMEAR WET MOUNT SALINE/INK: CPT

## 2022-04-18 PROCEDURE — 87491 CHLMYD TRACH DNA AMP PROBE: CPT

## 2022-04-18 PROCEDURE — 81001 URINALYSIS AUTO W/SCOPE: CPT

## 2022-04-18 PROCEDURE — 74011250637 HC RX REV CODE- 250/637: Performed by: STUDENT IN AN ORGANIZED HEALTH CARE EDUCATION/TRAINING PROGRAM

## 2022-04-18 RX ORDER — LANOLIN ALCOHOL/MO/W.PET/CERES
25 CREAM (GRAM) TOPICAL ONCE
Status: COMPLETED | OUTPATIENT
Start: 2022-04-18 | End: 2022-04-19

## 2022-04-18 RX ORDER — ACETAMINOPHEN 500 MG
1000 TABLET ORAL
Status: COMPLETED | OUTPATIENT
Start: 2022-04-18 | End: 2022-04-18

## 2022-04-18 RX ADMIN — ACETAMINOPHEN 1000 MG: 500 TABLET ORAL at 21:47

## 2022-04-19 VITALS
HEIGHT: 62 IN | WEIGHT: 145 LBS | BODY MASS INDEX: 26.68 KG/M2 | HEART RATE: 87 BPM | RESPIRATION RATE: 14 BRPM | SYSTOLIC BLOOD PRESSURE: 114 MMHG | TEMPERATURE: 98 F | OXYGEN SATURATION: 98 % | DIASTOLIC BLOOD PRESSURE: 69 MMHG

## 2022-04-19 PROCEDURE — 74011250637 HC RX REV CODE- 250/637: Performed by: STUDENT IN AN ORGANIZED HEALTH CARE EDUCATION/TRAINING PROGRAM

## 2022-04-19 RX ORDER — B-COMPLEX WITH VITAMIN C
1 TABLET ORAL DAILY
Qty: 30 TABLET | Refills: 0 | Status: SHIPPED | OUTPATIENT
Start: 2022-04-19 | End: 2022-08-09 | Stop reason: SDUPTHER

## 2022-04-19 RX ORDER — DOXYLAMINE SUCCINATE AND PYRIDOXINE HYDROCHLORIDE, DELAYED RELEASE TABLETS 10 MG/10 MG 10; 10 MG/1; MG/1
1 TABLET, DELAYED RELEASE ORAL
Qty: 30 TABLET | Refills: 0 | Status: SHIPPED | OUTPATIENT
Start: 2022-04-19 | End: 2022-08-09 | Stop reason: ALTCHOICE

## 2022-04-19 RX ADMIN — Medication 12.5 MG: at 00:08

## 2022-04-19 RX ADMIN — PYRIDOXINE HCL TAB 50 MG 25 MG: 50 TAB at 00:07

## 2022-04-19 NOTE — ED TRIAGE NOTES
Pt states she had a positive pregnancy test last Friday but started today with light vaginal bleeding and abd pain/cramping.

## 2022-04-19 NOTE — DISCHARGE INSTRUCTIONS
Thank you! Thank you for allowing me to care for you in the emergency department. I sincerely hope that you are satisfied with your visit today. It is my goal to provide you with excellent care. Below you will find a list of your labs and imaging from your visit today. Should you have any questions regarding these results please do not hesitate to call the emergency department. Labs -     Recent Results (from the past 12 hour(s))   BETA HCG, QT    Collection Time: 04/18/22  9:48 PM   Result Value Ref Range    Beta HCG, ,348.0 (H) 0 - 6 mIU/mL   URINALYSIS W/ REFLEX CULTURE    Collection Time: 04/18/22  9:48 PM    Specimen: Urine   Result Value Ref Range    Color Yellow/Straw      Appearance Clear Clear      Specific gravity 1.019 1.003 - 1.030      pH (UA) 6.0 5.0 - 8.0      Protein Negative Negative mg/dL    Glucose Negative Negative mg/dL    Ketone Negative Negative mg/dL    Bilirubin Negative Negative      Blood Negative Negative      Urobilinogen 0.1 0.1 - 1.0 EU/dL    Nitrites Negative Negative      Leukocyte Esterase Negative Negative      UA:UC IF INDICATED Culture not indicated by UA result Culture not indicated by UA result      WBC 0-4 0 - 4 /hpf    RBC 0-5 0 - 5 /hpf    Bacteria Negative Negative /hpf    Mucus 1+ /lpf   KOH, OTHER SOURCES    Collection Time: 04/18/22  9:48 PM    Specimen: Swab; Other   Result Value Ref Range    Special Requests: No Special Requests      KOH No yeast seen     TRICHOMONAS RAPID AG    Collection Time: 04/18/22  9:48 PM   Result Value Ref Range    Trichomonas, rapid Ag Negative Negative         Radiologic Studies -   No orders to display     CT Results  (Last 48 hours)      None          CXR Results  (Last 48 hours)      None               If you feel that you have not received excellent quality care or timely care, please ask to speak to the nurse manager. Please choose us in the future for your continued health care needs. ------------------------------------------------------------------------------------------------------------  The exam and treatment you received in the Emergency Department were for an urgent problem and are not intended as complete care. It is important that you follow-up with a doctor, nurse practitioner, or physician assistant to:  (1) confirm your diagnosis,  (2) re-evaluation of changes in your illness and treatment, and  (3) for ongoing care. If your symptoms become worse or you do not improve as expected and you are unable to reach your usual health care provider, you should return to the Emergency Department. We are available 24 hours a day. Please take your discharge instructions with you when you go to your follow-up appointment. If you have any problem arranging a follow-up appointment, contact the Emergency Department immediately. If a prescription has been provided, please have it filled as soon as possible to prevent a delay in treatment. Read the entire medication instruction sheet provided to you by the pharmacy. If you have any questions or reservations about taking the medication due to side effects or interactions with other medications, please call your primary care physician or contact the ER to speak with the charge nurse. Make an appointment with your family doctor or the physician you were referred to for follow-up of this visit as instructed on your discharge paperwork, as this is a mandatory follow-up. Return to the ER if you are unable to be seen or if you are unable to be seen in a timely manner. If you have any problem arranging the follow-up visit, contact the Emergency Department immediately.

## 2022-04-19 NOTE — ED PROVIDER NOTES
[facility]  EMERGENCY DEPARTMENT ENCOUNTER NOTE    Date: 2022  Patient Name: Cristy Pierce      History of Presenting Illness     Chief Complaint   Patient presents with    Vaginal Bleeding    Pregnancy Problem       History Provided By: Patient    HPI: Cristy Pierce, 27 y.o. female G3, P3 +0 with no chronic PMH of note presents to the ED with vaginal spotting 1 day and abdominal cramps for 3 weeks. Symptoms are intermittent, mild severity, nothing specific makes it better or worse with no association symptoms. She is denying any vaginal discharge. She is in a relationship with a male partner and does not have concern for CVA. However, she would like to be checked. She is reporting morning sickness has been going for weeks. Vaginal spoting. LMP is 2/15/2022. Chart review reveals that her blood type is A+. OB Dr. Yoder Standing    There are no other complaints, changes, or physical findings at this time. PCP: Hazel Oakley MD    Current Outpatient Medications   Medication Sig Dispense Refill    prenatal vit no.130-iron-folic (Prenatal Vitamin) 27 mg iron- 800 mcg tab tablet Take 1 Tablet by mouth daily. 30 Tablet 0    doxylamine-pyridoxine, vit B6, (DICLEGIS) 10-10 mg TbEC DR tablet Take 1 Tablet by mouth nightly. 30 Tablet 0    folic acid (FOLVITE) 1 mg tablet Take 1 Tab by mouth daily. 90 Tab 3    ferrous sulfate (IRON) 325 mg (65 mg iron) EC tablet Take 1 Tab by mouth every other day.  61 Tab 3       Past History     Past Medical History:  Past Medical History:   Diagnosis Date         Anemia     Anemia during pregnancy in third trimester     Beta thalassemia trait 2021    Chlamydia     History of group B Streptococcus (GBS) infection     History of  delivery     HPV (human papilloma virus) infection     IUGR (intrauterine growth restriction) affecting care of mother, third trimester, fetus 1 2018  EFW 6%.,  grams, 4 lb. 9 oz., Doppler umbilical artery wnl.  Kidney infection        Past Surgical History:  No past surgical history on file. Family History:  Family History   Problem Relation Age of Onset    Lupus Mother     Diabetes Father     Cancer Neg Hx        Social History:  Social History     Tobacco Use    Smoking status: Never Smoker    Smokeless tobacco: Never Used   Substance Use Topics    Alcohol use: No     Alcohol/week: 0.0 standard drinks    Drug use: No       Allergies:  No Known Allergies      Review of Systems     Review of Systems    A 10 point review of system was performed was negative except as noted above HPI    Physical Exam     Physical Exam  Vitals and nursing note reviewed. Constitutional:       General: She is not in acute distress. Appearance: She is well-developed. She is not diaphoretic. HENT:      Head: Normocephalic and atraumatic. Eyes:      Extraocular Movements: Extraocular movements intact. Conjunctiva/sclera: Conjunctivae normal.   Cardiovascular:      Rate and Rhythm: Normal rate and regular rhythm. Heart sounds: Normal heart sounds. Pulmonary:      Effort: Pulmonary effort is normal.      Breath sounds: Normal breath sounds. Abdominal:      Palpations: Abdomen is soft. Tenderness: There is no abdominal tenderness. Musculoskeletal:      Cervical back: Neck supple. Right lower leg: No tenderness. No edema. Left lower leg: No tenderness. No edema. Neurological:      General: No focal deficit present. Mental Status: She is alert and oriented to person, place, and time.          Lab and Diagnostic Study Results     Labs -     Recent Results (from the past 12 hour(s))   BETA HCG, QT    Collection Time: 04/18/22  9:48 PM   Result Value Ref Range    Beta HCG, ,348.0 (H) 0 - 6 mIU/mL   URINALYSIS W/ REFLEX CULTURE    Collection Time: 04/18/22  9:48 PM    Specimen: Urine   Result Value Ref Range    Color Yellow/Straw Appearance Clear Clear      Specific gravity 1.019 1.003 - 1.030      pH (UA) 6.0 5.0 - 8.0      Protein Negative Negative mg/dL    Glucose Negative Negative mg/dL    Ketone Negative Negative mg/dL    Bilirubin Negative Negative      Blood Negative Negative      Urobilinogen 0.1 0.1 - 1.0 EU/dL    Nitrites Negative Negative      Leukocyte Esterase Negative Negative      UA:UC IF INDICATED Culture not indicated by UA result Culture not indicated by UA result      WBC 0-4 0 - 4 /hpf    RBC 0-5 0 - 5 /hpf    Bacteria Negative Negative /hpf    Mucus 1+ /lpf   KOH, OTHER SOURCES    Collection Time: 04/18/22  9:48 PM    Specimen: Swab; Other   Result Value Ref Range    Special Requests: No Special Requests      KOH No yeast seen     TRICHOMONAS RAPID AG    Collection Time: 04/18/22  9:48 PM   Result Value Ref Range    Trichomonas, rapid Ag Negative Negative         Radiologic Studies -   [unfilled]  CT Results  (Last 48 hours)    None        CXR Results  (Last 48 hours)    None          Medical Decision Making and ED Course   - I am the first and primary provider for this patient AND AM THE PRIMARY PROVIDER OF RECORD. - I reviewed the vital signs, available nursing notes, past medical history, past surgical history, family history and social history. - Initial assessment performed. The patients presenting problems have been discussed, and the staff are in agreement with the care plan formulated and outlined with them. I have encouraged them to ask questions as they arise throughout their visit. Vital Signs-Reviewed the patient's vital signs. Patient Vitals for the past 24 hrs:   Temp Pulse Resp BP SpO2   04/19/22 0017 -- 87 14 114/69 98 %   04/18/22 2055 98 °F (36.7 °C) (!) 110 16 115/68 100 %       Records Reviewed: Nursing Notes and Old Medical Records    Provider Notes (Medical Decision Making): #STI screening: Patient is denying any symptoms that may suggest STD infection.   She is reporting that she is in a monogamous relationship with a single male partner. However given that she is pregnant she would like to be screened for STI. She is counseled on the results of her work-up in the ED which was negative and pending GC chlamydia. She is not concerned at this point and would want a wait for the results before being treated. She is encouraged to follow-up on her results on the portal and will be called with results of the positive. #Abdominal cramps and vaginal spotting: Patient has been having intermittent abdominal cramps for 3 weeks. She does have an established follow-up with obstetrics. The concerns that she has today is the vaginal finding which is mild and intermittent. No passing of clots. She would like to get her pregnancy checked. No other constitutional symptoms. hCG was done which was high followed by bedside point-of-care ultrasound revealing that the patient is having a single IUP at 9 weeks and 1 day with normal fetal heart tone. I did discuss with her her ED evaluation, work-up, and clinical impression disposition she is agreeable. She will be able to arrange follow-up with OB. Will manage her morning sickness with Diclegis. Anticipatory guidance and return precautions discussed with the patient she is agreeable. Disposition     Disposition: Condition improved  DC- Adult Discharges: All of the diagnostic tests were reviewed and questions answered. Diagnosis, care plan and treatment options were discussed. The patient understands the instructions and will follow up as directed. The patients results have been reviewed with them. They have been counseled regarding their diagnosis. The patient verbally convey understanding and agreement of the signs, symptoms, diagnosis, treatment and prognosis and additionally agrees to follow up as recommended with their PCP in 24 - 48 hours.   They also agree with the care-plan and convey that all of their questions have been answered. I have also put together some discharge instructions for them that include: 1) educational information regarding their diagnosis, 2) how to care for their diagnosis at home, as well a 3) list of reasons why they would want to return to the ED prior to their follow-up appointment, should their condition change. Discharged      DISCHARGE PLAN:  1. Current Discharge Medication List      CONTINUE these medications which have NOT CHANGED    Details   aspirin delayed-release 81 mg tablet Take 1 Tab by mouth daily. Qty: 90 Tab, Refills: 2    Associated Diagnoses: Dichorionic diamniotic twin pregnancy in second trimester      folic acid (FOLVITE) 1 mg tablet Take 1 Tab by mouth daily. Qty: 90 Tab, Refills: 3    Associated Diagnoses: Dichorionic diamniotic twin pregnancy in second trimester      ferrous sulfate (IRON) 325 mg (65 mg iron) EC tablet Take 1 Tab by mouth every other day. Qty: 60 Tab, Refills: 3    Associated Diagnoses: Dichorionic diamniotic twin pregnancy in second trimester      PRENATAL VIT CALC,IRON,FOLIC (PRENATAL VITAMIN PO) Take  by mouth. 2.   Follow-up Information     Follow up With Specialties Details Why [address] EMERGENCY DEPT Emergency Medicine Go to  As needed, If symptoms worsen [facility] Desean Bass MD Obstetrics & Gynecology, Gynecology, Obstetrics Schedule an appointment as soon as possible for a visit in 3 days For reevaluation, Discuss your visit to the ER [address] [place] [address]  [phone]          3. Return to ED if worse   4.    Discharge Medication List as of 4/19/2022 12:09 AM      START taking these medications    Details   doxylamine-pyridoxine, vit B6, (DICLEGIS) 10-10 mg TbEC DR tablet Take 1 Tablet by mouth nightly., Normal, Disp-30 Tablet, R-0         CONTINUE these medications which have CHANGED    Details   prenatal vit no.130-iron-folic (Prenatal Vitamin) 27 mg iron- 800 mcg tab tablet Take 1 Tablet by mouth daily. , Normal, Disp-30 Tablet, R-0         CONTINUE these medications which have NOT CHANGED    Details   folic acid (FOLVITE) 1 mg tablet Take 1 Tab by mouth daily. , Normal, Disp-90 Tab, R-3      ferrous sulfate (IRON) 325 mg (65 mg iron) EC tablet Take 1 Tab by mouth every other day., Normal, Disp-60 Tab, R-3         STOP taking these medications       aspirin delayed-release 81 mg tablet Comments:   Reason for Stopping:               Diagnosis     Clinical Impression:   1. 9 weeks gestation of pregnancy    2. Vaginal spotting    3. Abdominal cramps        Attestations: Jones Echavarria MD    Please note that this dictation was completed with [employer], the computer voice recognition software. Quite often unanticipated grammatical, syntax, homophones, and other interpretive errors are inadvertently transcribed by the computer software. Please disregard these errors. Please excuse any errors that have escaped final proofreading. Thank you.

## 2022-04-20 LAB
C TRACH RRNA SPEC QL NAA+PROBE: NEGATIVE
N GONORRHOEA RRNA SPEC QL NAA+PROBE: NEGATIVE
PLEASE NOTE:, 188601: NORMAL
SPECIMEN SOURCE: NORMAL

## 2022-07-06 ENCOUNTER — INITIAL PRENATAL (OUTPATIENT)
Dept: OBGYN CLINIC | Age: 27
End: 2022-07-06
Payer: MEDICAID

## 2022-07-06 ENCOUNTER — TELEPHONE (OUTPATIENT)
Dept: OBGYN CLINIC | Age: 27
End: 2022-07-06

## 2022-07-06 VITALS
HEIGHT: 62 IN | BODY MASS INDEX: 21.73 KG/M2 | OXYGEN SATURATION: 98 % | HEART RATE: 103 BPM | RESPIRATION RATE: 18 BRPM | SYSTOLIC BLOOD PRESSURE: 114 MMHG | WEIGHT: 118.1 LBS | TEMPERATURE: 97.1 F | DIASTOLIC BLOOD PRESSURE: 58 MMHG

## 2022-07-06 DIAGNOSIS — Z3A.20 20 WEEKS GESTATION OF PREGNANCY: ICD-10-CM

## 2022-07-06 DIAGNOSIS — Z11.3 VENEREAL DISEASE SCREENING: ICD-10-CM

## 2022-07-06 DIAGNOSIS — O34.219 DESIRES VBAC (VAGINAL BIRTH AFTER CESAREAN) TRIAL: ICD-10-CM

## 2022-07-06 DIAGNOSIS — Z98.891 HISTORY OF CESAREAN SECTION, LOW TRANSVERSE: ICD-10-CM

## 2022-07-06 DIAGNOSIS — Z12.4 SCREENING FOR CERVICAL CANCER: ICD-10-CM

## 2022-07-06 DIAGNOSIS — O09.892 HISTORY OF PRETERM DELIVERY, CURRENTLY PREGNANT IN SECOND TRIMESTER: Primary | ICD-10-CM

## 2022-07-06 DIAGNOSIS — Z34.92 PRENATAL CARE IN SECOND TRIMESTER: ICD-10-CM

## 2022-07-06 DIAGNOSIS — O99.012 ANEMIA AFFECTING PREGNANCY IN SECOND TRIMESTER: Primary | ICD-10-CM

## 2022-07-06 DIAGNOSIS — D56.3 BETA THALASSEMIA TRAIT: ICD-10-CM

## 2022-07-06 DIAGNOSIS — O09.92 HIGH-RISK PREGNANCY IN SECOND TRIMESTER: ICD-10-CM

## 2022-07-06 PROBLEM — O60.02 PRETERM LABOR IN SECOND TRIMESTER WITHOUT DELIVERY: Status: RESOLVED | Noted: 2021-04-03 | Resolved: 2022-07-06

## 2022-07-06 PROBLEM — R79.89 LOW TSH LEVEL: Status: RESOLVED | Noted: 2021-02-08 | Resolved: 2022-07-06

## 2022-07-06 PROBLEM — A74.9 CHLAMYDIA INFECTION AFFECTING PREGNANCY IN FIRST TRIMESTER: Status: RESOLVED | Noted: 2018-08-20 | Resolved: 2022-07-06

## 2022-07-06 PROBLEM — O98.811 CHLAMYDIA INFECTION AFFECTING PREGNANCY IN FIRST TRIMESTER: Status: RESOLVED | Noted: 2018-08-20 | Resolved: 2022-07-06

## 2022-07-06 PROBLEM — Z87.59 HISTORY OF PRIOR PREGNANCY WITH IUGR NEWBORN: Status: RESOLVED | Noted: 2018-09-07 | Resolved: 2022-07-06

## 2022-07-06 PROBLEM — O30.049 DICHORIONIC DIAMNIOTIC TWIN PREGNANCY: Status: RESOLVED | Noted: 2021-02-01 | Resolved: 2022-07-06

## 2022-07-06 PROCEDURE — 0501F PRENATAL FLOW SHEET: CPT | Performed by: OBSTETRICS & GYNECOLOGY

## 2022-07-06 RX ORDER — LACTOBACILLUS COMBINATION NO.9 4B CELL
1 CAPSULE ORAL DAILY
Qty: 60 TABLET | Refills: 2 | Status: SHIPPED | OUTPATIENT
Start: 2022-07-06 | End: 2022-08-09

## 2022-07-06 NOTE — PROGRESS NOTES
Chief Complaint   Patient presents with    Initial Prenatal Visit     1. \"Have you been to the ER, urgent care clinic since your last visit? Hospitalized since your last visit? \" Yes [facility]    2. \"Have you seen or consulted any other health care providers outside of the [address] [place] [address] since your last visit? \" No     3. For patients aged 39-70: Has the patient had a colonoscopy? NA - based on age     If the patient is female:    4. For patients aged 41-77: Has the patient had a mammogram within the past 2 years? NA - based on age    11. For patients aged 21-65: Has the patient had a pap smear?  Yes - no Care Gap present     Visit Vitals  BP (!) 114/58 (BP 1 Location: Right arm, BP Patient Position: Sitting, BP Cuff Size: Adult)   Pulse (!) 103   Temp 97.1 °F (36.2 °C) (Temporal)   Resp 18   Ht 5' 2\" (1.575 m)   Wt 118 lb 1.6 oz (53.6 kg)   LMP 02/15/2022 (Exact Date)   SpO2 98%   BMI 21.60 kg/m²

## 2022-07-06 NOTE — PROGRESS NOTES
HPI: Gerber Renner is a 27 y.o. female [id number], LMP 2/15/22, who presents today for the following:  Chief Complaint   Patient presents with    Initial Prenatal Visit        Pregnancy confirmed at ER. She was undecided about pregnancy hence late to prenatal care. Unplanned but desired pregnancy. N/v has resolved. Denies LOF/VB/CTXs. +FM. Not taking PNV. Ran out of rx. Her first delivery was IOL for FGR at 35+6wga. Her second delivery was  twins with vaginal delivery for twin A and PCS for twin B due to breech presentation and unable to vert. She would like to [facility]. She presents with her aunt. Past Medical History:   Diagnosis Date         Anemia     Anemia during pregnancy in third trimester     Beta thalassemia trait 2021    Chlamydia     History of group B Streptococcus (GBS) infection     History of  delivery     HPV (human papilloma virus) infection     IUGR (intrauterine growth restriction) affecting care of mother, third trimester, fetus 1 2018  EFW 6%., 2076 grams, 4 lb. 9 oz., Doppler umbilical artery wnl.  Kidney infection     Low TSH level 2021    2/3/2021- TSH= 0.04.   T3, T4, and TSI labs ordered 2/10/2021       Past Surgical History:   Procedure Laterality Date    HX  SECTION      x 1 for twin B       Family History   Problem Relation Age of Onset    Lupus Mother     Diabetes Father     Cancer Neg Hx        Social History     Socioeconomic History    Marital status: SINGLE     Spouse name: Not on file    Number of children: Not on file    Years of education: Not on file    Highest education level: 12th grade   Occupational History    Occupation:    Tobacco Use    Smoking status: Never Smoker    Smokeless tobacco: Never Used   Substance and Sexual Activity    Alcohol use: No     Alcohol/week: 0.0 standard drinks    Drug use: No    Sexual activity: Yes     Partners: Male     Birth control/protection: None     Comment: h/o HPV and Chlamydia   Other Topics Concern     Service No    Blood Transfusions No    Caffeine Concern Not Asked    Occupational Exposure Not Asked    Hobby Hazards Not Asked    Sleep Concern Not Asked    Stress Concern Not Asked    Weight Concern Not Asked    Special Diet Not Asked    Back Care Not Asked    Exercise Not Asked    Bike Helmet Not Asked    Seat Belt Yes    Self-Exams Yes   Social History Narrative    Not on file     Social Determinants of Health     Financial Resource Strain:     Difficulty of Paying Living Expenses: Not on file   Food Insecurity:     Worried About Running Out of Food in the Last Year: Not on file    Eric of Food in the Last Year: Not on file   Transportation Needs:     Lack of Transportation (Medical): Not on file    Lack of Transportation (Non-Medical): Not on file   Physical Activity:     Days of Exercise per Week: Not on file    Minutes of Exercise per Session: Not on file   Stress:     Feeling of Stress : Not on file   Social Connections:     Frequency of Communication with Friends and Family: Not on file    Frequency of Social Gatherings with Friends and Family: Not on file    Attends Anabaptist Services: Not on file    Active Member of [address] [place] [address] or Organizations: Not on file    Attends Club or Organization Meetings: Not on file    Marital Status: Not on file   Intimate Partner Violence:     Fear of Current or Ex-Partner: Not on file    Emotionally Abused: Not on file    Physically Abused: Not on file    Sexually Abused: Not on file   Housing Stability:     Unable to Pay for Housing in the Last Year: Not on file    Number of [place] in the Last Year: Not on file    Unstable Housing in the Last Year: Not on file         Review of Systems: Denies issues with eyes, ears, mouth, nose. Denies fevers/chills, significant weight loss/gain.  Denies chest pain, shortness of breath, nausea, vomiting, constipation, diarrhea or abdominal pain. Denies dysuria. Denies muscle aches, weakness, numbness or tingling. Denies issues with breasts. Denies bleeding/clotting d/o's. Denies depression, S/HI. +Anxiety. OBJECTIVE:  BP (!) 114/58 (BP 1 Location: Right arm, BP Patient Position: Sitting, BP Cuff Size: Adult)   Pulse (!) 103   Temp 97.1 °F (36.2 °C) (Temporal)   Resp 18   Ht 5' 2\" (1.575 m)   Wt 118 lb 1.6 oz (53.6 kg)   LMP 02/15/2022 (Exact Date)   SpO2 98%   BMI 21.60 kg/m²      Constitutional  · Appearance: well-nourished, well developed, alert, in no acute distress    HENT  · Head and Face: appears normal    Neck  · Inspection/Palpation: normal appearance    Breasts:  Symmetric, soft, nontender, no palpable masses, no nipple discharge, no skin changes; no palpable axillary or supraclavicular lymphadenopathy      Chest  · Respiratory Effort: normal        Genitourinary  · External Genitalia: normal appearance for age, no discharge present, no tenderness present, no inflammatory lesions present, no masses present, no atrophy present  · Vagina: normal vaginal vault without central or paravaginal defects, no discharge present, no inflammatory lesions present, no masses present  · Bladder: non-tender to palpation  · Urethra: appears normal  · Cervix: normal, no cervical motion tenderness or abnormal discharge; pap smear obtained  · Uterus: normal shape and consistency, approximately 21 week sized  · Adnexa: no adnexal tenderness present, no adnexal masses present  · Perineum: perineum within normal limits, no evidence of trauma, no rashes or skin lesions present  · Anus: anus within normal limits, no hemorrhoids present    Skin  · General Inspection: no rash, no lesions identified    Neurologic/Psychiatric  · Mental Status:  · Orientation: grossly oriented to person, place and time  · Mood and Affect: mood normal, affect appropriate      No results found for this visit on 07/06/22.    Limited OB us (transbadominal):  Elliott fetus in double footling breech presentation.  bpm.  Anterior placenta. Femur length 3.32cm with gestational age of 22w3d which is consistent with LMP and 1st trim ultrasound patient had. Assessment/plan:    ICD-10-CM ICD-9-CM    1. History of  delivery, currently pregnant in second trimester  O09.892 V23.89 REFERRAL TO MATERNAL FETAL MEDICINE   2. Prenatal care in second trimester  Z34.92 V22.1 PNV62-FA-om3-dha-epa-fish oil (Prenatal Gummy) 400 mcg-35 mg -25 mg-5 mg chew   3. 20 weeks gestation of pregnancy  Z3A.20 V22.2    4. High-risk pregnancy in second trimester  O09.92 V23.9 REFERRAL TO MATERNAL FETAL MEDICINE      PRENATAL PROFILE I      SMN1 COPY NUMBER ANALYSIS      CYSTIC FIBROSIS MUTATION 97      CULTURE, URINE      HIV 1/2 AG/AB, 4TH GENERATION,W RFLX CONFIRM      HEPATITIS C AB, RFLX TO QT BY PCR      TSH RFX ON ABNORMAL TO FREE T4      PNV62-FA-om3-dha-epa-fish oil (Prenatal Gummy) 400 mcg-35 mg -25 mg-5 mg chew   5. Screening for cervical cancer  Z12.4 V76.2    6. Venereal disease screening  Z11.3 V74.5       -OB labs ordered including QUAD screen. -G/C/T cx/pap obtained. -First trim/second trim counseling performed. -[facility] referral for h/o PTD and beta thalassemia trait. Anatomy scan with them. -H/o  section x 1 for breech presentation of twin B. Interested in [facility]. Reviewed risk of uterine rupture less than 1%, risk of emergency  section and its associated risks including but not limited to fetal or maternal death. Also reviewed that not all providers perform TOLAC at this facility. Patient appears to be a good candidate for trial of labor.

## 2022-07-06 NOTE — PATIENT INSTRUCTIONS
Deciding About Vaginal Birth After   Overview  Years ago, if you had a  birth, all of your future babies had to be born this way. Today, even if you had a  in the past, you may be able to try to have a vaginal birth for the next baby. This is called vaginal birth after , or  (say \"VEE-back\").  isn't possible for some people. There may be concerns about their health and the baby's health. Ask your doctor or midwife if trying labor is right for you. You could still need a  even if you go into labor. Follow-up care is a key part of your treatment and safety. Be sure to make and go to all appointments, and call your doctor if you are having problems. It's also a good idea to know your test results and keep a list of the medicines you take. Why would you have a ? · You want to experience a vaginal birth. · The scar on your uterus is the kind that would allow a safe . · You feel that the benefits of vaginal birth outweigh the small risk of the scar on your uterus breaking open during labor. If a scar breaks open, it can be very dangerous for both you and your baby. Why would you not be able to have a ? · Your hospital does not offer . · You are at greater risk of your uterus tearing because:  ? The scar on your uterus is vertical. This kind of scar does not usually allow a safe .  ? You have had more than two cesareans. ? You are carrying triplets or more. · You have a placenta problem or another medical issue that could make a vaginal birth risky. · Something happens during your pregnancy or labor that requires a . For example:  ? You develop a problem with your blood pressure. ? Your baby is not head-down or is breech (bottom-down) or sideways. ? Your labor is not progressing well.  ? Your baby is having problems. ? The scar on your uterus is bleeding or getting weak. Where can you learn more?   Go to http://www.gray.com/  Enter [id number] in the search box to learn more about \"Deciding About Vaginal Birth After . \"  Current as of: 2021               Content Version: 13.2  © 4675-7399 [employer]. Care instructions adapted under license by [employer] (which disclaims liability or warranty for this information). If you have questions about a medical condition or this instruction, always ask your healthcare professional. [place] [address] any warranty or liability for your use of this information.

## 2022-07-08 LAB
C TRACH RRNA CVX QL NAA+PROBE: NEGATIVE
CYTOLOGIST CVX/VAG CYTO: NORMAL
CYTOLOGY CVX/VAG DOC CYTO: NORMAL
CYTOLOGY CVX/VAG DOC THIN PREP: NORMAL
DX ICD CODE: NORMAL
LABCORP, 190119: NORMAL
Lab: NORMAL
N GONORRHOEA RRNA CVX QL NAA+PROBE: NEGATIVE
OTHER STN SPEC: NORMAL
STAT OF ADQ CVX/VAG CYTO-IMP: NORMAL
T VAGINALIS RRNA SPEC QL NAA+PROBE: NEGATIVE

## 2022-07-12 ENCOUNTER — TELEPHONE (OUTPATIENT)
Dept: OBGYN CLINIC | Age: 27
End: 2022-07-12

## 2022-07-12 NOTE — TELEPHONE ENCOUNTER
Called patient to informed of MFM appointment scheduled for 2022 @1:30pm at [facility]. Left voice mail for patient to return call to office.

## 2022-07-14 ENCOUNTER — TELEPHONE (OUTPATIENT)
Dept: OBGYN CLINIC | Age: 27
End: 2022-07-14

## 2022-07-14 NOTE — TELEPHONE ENCOUNTER
Spoke with patient informed of MFM appointment. Patient verbalized understanding. Gave location and phone number.

## 2022-07-15 PROBLEM — Z14.8 GENETIC CARRIER: Status: ACTIVE | Noted: 2022-07-15

## 2022-07-15 PROBLEM — O99.012 ANEMIA DURING PREGNANCY IN SECOND TRIMESTER: Status: ACTIVE | Noted: 2018-09-10

## 2022-07-15 RX ORDER — DOCUSATE SODIUM 100 MG/1
100 CAPSULE, LIQUID FILLED ORAL DAILY
Qty: 60 CAPSULE | Refills: 2 | Status: SHIPPED | OUTPATIENT
Start: 2022-07-15 | End: 2022-09-13

## 2022-07-15 RX ORDER — FERROUS SULFATE 325(65) MG
325 TABLET, DELAYED RELEASE (ENTERIC COATED) ORAL EVERY OTHER DAY
Qty: 60 TABLET | Refills: 3 | Status: ON HOLD | OUTPATIENT
Start: 2022-07-15

## 2022-07-15 NOTE — TELEPHONE ENCOUNTER
Can you pls help me contact [facility] with the information they were missing for the QUAD screen?  Ty.

## 2022-07-15 NOTE — TELEPHONE ENCOUNTER
I tried to call the patient but it went straight to voicemail. I left her message stating we will call her back. Can you please review with her that she is mildly anemic. We will check to confirm and see if it is iron at her next visit but in the meantime I will send iron pills. Otherwise we are waiting on the results of the quad screen as they needed some more information. Further screening to see if she is a carrier of the gene involved in spinal muscular atrophy (a muscle wasting disease) is showing she is at risk for being a carrier so she should get genetic counseling with the high risk doctors. The referral was already placed at the last visit. Latasha Sosa, can you please send the labs and updated request for genetic counseling to [facility]? Flavia Rhodes Thank you.

## 2022-07-20 ENCOUNTER — TELEPHONE (OUTPATIENT)
Dept: OBGYN CLINIC | Age: 27
End: 2022-07-20

## 2022-07-20 LAB
2ND TRIMESTER 4 SCREEN SERPL-IMP: NORMAL
ABO GROUP BLD: ABNORMAL
AFP ADJ MOM SERPL: 1.13
AFP SERPL-MCNC: 89.1 NG/ML
AGE AT DELIVERY: 24.9 YR
BACTERIA UR CULT: NORMAL
BASOPHILS # BLD AUTO: 0 X10E3/UL (ref 0–0.2)
BASOPHILS NFR BLD AUTO: 1 %
BLD GP AB SCN SERPL QL: NEGATIVE
CFTR MUT ANL BLD/T: NORMAL
CLINICAL INFO: ABNORMAL
EOSINOPHIL # BLD AUTO: 0.1 X10E3/UL (ref 0–0.4)
EOSINOPHIL NFR BLD AUTO: 1 %
ERYTHROCYTE [DISTWIDTH] IN BLOOD BY AUTOMATED COUNT: 15.8 % (ref 11.7–15.4)
ETHNIC BACKGROUND STATED: ABNORMAL
FET TS 18 RISK FROM MAT AGE: NORMAL
FET TS 21 RISK FROM MAT AGE: 1035
GA METHOD: NORMAL
GA: 20.1 WEEKS
GENE DIS ANL CARRIER INTERP-IMP: NORMAL
GENERAL COMMENTS: ABNORMAL
GENETIC COUNSELOR, 450001: ABNORMAL
HBV SURFACE AG SERPL QL IA: NEGATIVE
HCG ADJ MOM SERPL: 1.36
HCG SERPL-ACNC: NORMAL MIU/ML
HCT VFR BLD AUTO: 31.3 % (ref 34–46.6)
HCV AB S/CO SERPL IA: <0.1 S/CO RATIO (ref 0–0.9)
HCV AB SERPL QL IA: NORMAL
HGB BLD-MCNC: 10 G/DL (ref 11.1–15.9)
HIV 1+2 AB+HIV1 P24 AG SERPL QL IA: NON REACTIVE
IDDM PATIENT QL: NO
IMM GRANULOCYTES # BLD AUTO: 0 X10E3/UL (ref 0–0.1)
IMM GRANULOCYTES NFR BLD AUTO: 1 %
INDICATION: ABNORMAL
INHIBIN A ADJ MOM SERPL: 1.15
INHIBIN A SERPL-MCNC: 243.26 PG/ML
LAB DIRECTOR NAME PROVIDER: ABNORMAL
LYMPHOCYTES # BLD AUTO: 1.6 X10E3/UL (ref 0.7–3.1)
LYMPHOCYTES NFR BLD AUTO: 26 %
MCH RBC QN AUTO: 24 PG (ref 26.6–33)
MCHC RBC AUTO-ENTMCNC: 31.9 G/DL (ref 31.5–35.7)
MCV RBC AUTO: 75 FL (ref 79–97)
MONOCYTES # BLD AUTO: 0.3 X10E3/UL (ref 0.1–0.9)
MONOCYTES NFR BLD AUTO: 5 %
MULTIPLE PREGNANCY: NO
NEURAL TUBE DEFECT RISK FETUS: NORMAL %
NEUTROPHILS # BLD AUTO: 4.2 X10E3/UL (ref 1.4–7)
NEUTROPHILS NFR BLD AUTO: 66 %
PLATELET # BLD AUTO: 194 X10E3/UL (ref 150–450)
RBC # BLD AUTO: 4.16 X10E6/UL (ref 3.77–5.28)
REF LAB TEST METHOD: ABNORMAL
RH BLD: POSITIVE
RPR SER QL: NON REACTIVE
RUBV IGG SERPL IA-ACNC: 1.67 INDEX
SERVICE CMNT-IMP: NORMAL
SMN1 GENE MUT ANL BLD/T: ABNORMAL
SPECIMEN SOURCE: ABNORMAL
TEST PERFORMANCE INFO SPEC: ABNORMAL
TEST PERFORMANCE INFO SPEC: ABNORMAL
TS 18 RISK FETUS: NORMAL
TS 21 RISK FETUS: 3206
TSH SERPL DL<=0.005 MIU/L-ACNC: 1.27 UIU/ML (ref 0.45–4.5)
U ESTRIOL ADJ MOM SERPL: 0.82
U ESTRIOL SERPL-MCNC: 1.97 NG/ML
WBC # BLD AUTO: 6.3 X10E3/UL (ref 3.4–10.8)

## 2022-07-20 NOTE — PROGRESS NOTES
Contacted [facility] spoke with Tung Alejandre and gave updated information with RK to recalculate AFP. They will resubmit updated information to the office once processed.

## 2022-07-20 NOTE — TELEPHONE ENCOUNTER
Patient contacted the office reports she was in the shower and she is losing some thick membrane like substance from her vagina. She denies any bleeding or cramping. Advised patient to go to ER to be evaluated.

## 2022-07-20 NOTE — TELEPHONE ENCOUNTER
----- Message from Beulah Guan MD sent at 7/15/2022  8:28 AM EDT -----  Can you pls help me contact [facility] with the information they were missing for the QUAD screen?  Ty.

## 2022-07-20 NOTE — TELEPHONE ENCOUNTER
Contacted [facility] spoke with [employer] provided updated information for roseanne for them to complete updates to patients AFP. They will process and send office updated information.

## 2022-08-09 ENCOUNTER — HOSPITAL ENCOUNTER (OUTPATIENT)
Age: 27
Discharge: LEFT BEFORE DISCHARGE | End: 2022-08-09
Attending: OBSTETRICS & GYNECOLOGY | Admitting: OBSTETRICS & GYNECOLOGY
Payer: MEDICAID

## 2022-08-09 ENCOUNTER — ROUTINE PRENATAL (OUTPATIENT)
Dept: OBGYN CLINIC | Age: 27
End: 2022-08-09

## 2022-08-09 ENCOUNTER — HOSPITAL ENCOUNTER (EMERGENCY)
Age: 27
Discharge: ANOTHER HOSPITAL | End: 2022-08-09

## 2022-08-09 VITALS
RESPIRATION RATE: 16 BRPM | OXYGEN SATURATION: 96 % | HEART RATE: 102 BPM | BODY MASS INDEX: 23.21 KG/M2 | SYSTOLIC BLOOD PRESSURE: 138 MMHG | TEMPERATURE: 97.1 F | WEIGHT: 126.1 LBS | HEIGHT: 62 IN | DIASTOLIC BLOOD PRESSURE: 65 MMHG

## 2022-08-09 VITALS
WEIGHT: 118 LBS | TEMPERATURE: 98.5 F | DIASTOLIC BLOOD PRESSURE: 66 MMHG | SYSTOLIC BLOOD PRESSURE: 110 MMHG | HEART RATE: 99 BPM | RESPIRATION RATE: 16 BRPM | HEIGHT: 62 IN | BODY MASS INDEX: 21.71 KG/M2

## 2022-08-09 DIAGNOSIS — O09.92 HIGH-RISK PREGNANCY IN SECOND TRIMESTER: ICD-10-CM

## 2022-08-09 DIAGNOSIS — O26.892 HEARTBURN DURING PREGNANCY IN SECOND TRIMESTER: ICD-10-CM

## 2022-08-09 DIAGNOSIS — O09.892 HISTORY OF PRETERM DELIVERY, CURRENTLY PREGNANT IN SECOND TRIMESTER: ICD-10-CM

## 2022-08-09 DIAGNOSIS — Z3A.25 25 WEEKS GESTATION OF PREGNANCY: ICD-10-CM

## 2022-08-09 DIAGNOSIS — R12 HEARTBURN DURING PREGNANCY IN SECOND TRIMESTER: ICD-10-CM

## 2022-08-09 DIAGNOSIS — Z34.92 PRENATAL CARE IN SECOND TRIMESTER: ICD-10-CM

## 2022-08-09 DIAGNOSIS — O21.9 NAUSEA AND VOMITING DURING PREGNANCY: Primary | ICD-10-CM

## 2022-08-09 PROBLEM — Z34.90 PREGNANCY: Status: ACTIVE | Noted: 2022-08-09

## 2022-08-09 LAB
APPEARANCE UR: CLEAR
BACTERIA URNS QL MICRO: NEGATIVE /HPF
BILIRUB UR QL: NEGATIVE
COLOR UR: YELLOW
GLUCOSE UR STRIP.AUTO-MCNC: NEGATIVE MG/DL
HGB UR QL STRIP: NEGATIVE
KETONES UR QL STRIP.AUTO: NEGATIVE MG/DL
LEUKOCYTE ESTERASE UR QL STRIP.AUTO: 75
MUCOUS THREADS URNS QL MICRO: ABNORMAL /LPF
NITRITE UR QL STRIP.AUTO: NEGATIVE
PH UR STRIP: 8 [PH] (ref 5–8)
PROT UR STRIP-MCNC: NEGATIVE MG/DL
RBC #/AREA URNS HPF: ABNORMAL /HPF (ref 0–5)
SP GR UR REFRACTOMETRY: 1.01 (ref 1–1.03)
UROBILINOGEN UR QL STRIP.AUTO: NEGATIVE EU/DL (ref 0.1–1)
WBC URNS QL MICRO: ABNORMAL /HPF (ref 0–4)

## 2022-08-09 PROCEDURE — 0502F SUBSEQUENT PRENATAL CARE: CPT | Performed by: OBSTETRICS & GYNECOLOGY

## 2022-08-09 PROCEDURE — 81001 URINALYSIS AUTO W/SCOPE: CPT

## 2022-08-09 PROCEDURE — 99284 EMERGENCY DEPT VISIT MOD MDM: CPT

## 2022-08-09 PROCEDURE — 96360 HYDRATION IV INFUSION INIT: CPT

## 2022-08-09 PROCEDURE — 74011250636 HC RX REV CODE- 250/636: Performed by: OBSTETRICS & GYNECOLOGY

## 2022-08-09 RX ORDER — PANTOPRAZOLE SODIUM 20 MG/1
20 TABLET, DELAYED RELEASE ORAL DAILY
Qty: 60 TABLET | Refills: 1 | Status: ON HOLD | OUTPATIENT
Start: 2022-08-09

## 2022-08-09 RX ORDER — B-COMPLEX WITH VITAMIN C
1 TABLET ORAL DAILY
Qty: 90 TABLET | Refills: 1 | Status: ON HOLD | OUTPATIENT
Start: 2022-08-09

## 2022-08-09 RX ORDER — SODIUM CHLORIDE, SODIUM LACTATE, POTASSIUM CHLORIDE, CALCIUM CHLORIDE 600; 310; 30; 20 MG/100ML; MG/100ML; MG/100ML; MG/100ML
999 INJECTION, SOLUTION INTRAVENOUS CONTINUOUS
Status: DISCONTINUED | OUTPATIENT
Start: 2022-08-09 | End: 2022-08-09 | Stop reason: HOSPADM

## 2022-08-09 RX ORDER — ONDANSETRON 4 MG/1
4 TABLET, ORALLY DISINTEGRATING ORAL
Qty: 60 TABLET | Refills: 1 | Status: SHIPPED | OUTPATIENT
Start: 2022-08-09 | End: 2022-09-20 | Stop reason: SDUPTHER

## 2022-08-09 RX ADMIN — SODIUM CHLORIDE, POTASSIUM CHLORIDE, SODIUM LACTATE AND CALCIUM CHLORIDE 999 ML/HR: 600; 310; 30; 20 INJECTION, SOLUTION INTRAVENOUS at 12:36

## 2022-08-09 NOTE — PROGRESS NOTES
Went to l&d today for dizziness and abdominal tightness. She received 1L IVF and left AMA due to feeling better. U/a pending. Declined SVE then but wants it now. +FM. Denies LOF/VB/CTXs. Anatomy scan nml per pt, awaiting report. 2nd trim labs ordered. Heartburn and nausea meds sent.

## 2022-08-09 NOTE — PROGRESS NOTES
Chief Complaint   Patient presents with    Routine Prenatal Visit     Wants something for nausea and vomiting request promethazine     1. \"Have you been to the ER, urgent care clinic since your last visit? Hospitalized since your last visit? \" Yes [facility]: [facility]    2. \"Have you seen or consulted any other health care providers outside of the [address] [place] [address] since your last visit? \" No     3. For patients aged 39-70: Has the patient had a colonoscopy? NA - based on age     If the patient is female:    4. For patients aged 41-77: Has the patient had a mammogram within the past 2 years? NA - based on age    11. For patients aged 21-65: Has the patient had a pap smear?  Yes - no Care Gap present    Visit Vitals  /65 (BP 1 Location: Right arm, BP Patient Position: Sitting, BP Cuff Size: Adult)   Pulse (!) 102   Temp 97.1 °F (36.2 °C) (Temporal)   Resp 16   Ht 5' 2\" (1.575 m)   Wt 126 lb 1.6 oz (57.2 kg)   LMP 02/15/2022 (Exact Date)   SpO2 96%   BMI 23.06 kg/m²

## 2022-08-09 NOTE — H&P
History and Physical    Patient: Vidal Perez MRN: [id number]  SSN: [id number]    YOB: 1995  Age: 27 y.o. Sex: female      Subjective:      Vidal Perez is a 27 y.o. female who presented to L and D by EMS after feeling faint. Denies any blackout episode. Notes + FM, denies any LOF or other sxs. 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 AA. Genetic counseling requested. History of group B Streptococcus (GBS) infection     History of  delivery     HPV (human papilloma virus) infection     IUGR (intrauterine growth restriction) affecting care of mother, third trimester, fetus 1 2018  EFW 6%., 2076 grams, 4 lb. 9 oz., Doppler umbilical artery wnl. Kidney infection     Low TSH level 2021    2/3/2021- TSH= 0.04. T3, T4, and TSI labs ordered 2/10/2021     Past Surgical History:   Procedure Laterality Date    HX  SECTION      x 1 for twin B      Family History   Problem Relation Age of Onset    Lupus Mother     Diabetes Father     Cancer Neg Hx      Social History     Tobacco Use    Smoking status: Never    Smokeless tobacco: Never   Substance Use Topics    Alcohol use: No     Alcohol/week: 0.0 standard drinks      Prior to Admission medications    Medication Sig Start Date End Date Taking? Authorizing Provider   ferrous sulfate (IRON) 325 mg (65 mg iron) EC tablet Take 1 Tablet by mouth every other day. 7/15/22  Yes Fred Jackson MD   ondansetron (ZOFRAN ODT) 4 mg disintegrating tablet Take 1 Tablet by mouth every eight (8) hours as needed for Nausea or Vomiting.  Indications: excessive vomiting in pregnancy 22   Fred Jackson MD   pantoprazole (PROTONIX) 20 mg tablet Take 1 Tablet by mouth in the morning. 8/9/22   Su Holloway MD   prenatal vit no.130-iron-folic (Prenatal Vitamin) 27 mg iron- 800 mcg tab tablet Take 1 Tablet by mouth in the morning. 8/9/22   Su Holloway MD   docusate sodium (COLACE) 100 mg capsule Take 1 Capsule by mouth daily for 60 doses. Indications: constipation  Patient not taking: Reported on 8/9/2022 7/15/22 9/13/22  Su Holloway MD   folic acid (FOLVITE) 1 mg tablet Take 1 Tab by mouth daily. Patient not taking: No sig reported 2/3/21   Yumiko Ziegler PA        No Known Allergies    Review of Systems:  A comprehensive review of systems was negative except for that written in the History of Present Illness.     Objective:     Vitals:    08/09/22 1218 08/09/22 1225 08/09/22 1230 08/09/22 1245   BP: 112/63 112/63 (!) 113/59 110/66   Pulse: (!) 104  (!) 101 99   Resp: 16      Temp: 98.5 °F (36.9 °C)      Weight: 118 lb (53.5 kg)      Height: 5' 2\" (1.575 m)           Physical Exam:  GENERAL: alert, cooperative, no distress, appears stated age  Abd: Gravid  NST with good BTBV, no Uc's  Pt refuses Vaginal exam    Assessment:     Hospital Problems  Date Reviewed: 8/9/2022            Codes Class Noted POA    Pregnancy ICD-10-CM: Z34.90  ICD-9-CM: V22.2  8/9/2022 Unknown           Plan:     IVF hydration  UA    Signed By: Felicia Bedoya MD     August 9, 2022
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
## 2022-09-07 ENCOUNTER — ROUTINE PRENATAL (OUTPATIENT)
Dept: OBGYN CLINIC | Age: 27
End: 2022-09-07
Payer: MEDICAID

## 2022-09-07 VITALS
HEART RATE: 94 BPM | RESPIRATION RATE: 18 BRPM | TEMPERATURE: 97.7 F | SYSTOLIC BLOOD PRESSURE: 104 MMHG | WEIGHT: 130.2 LBS | HEIGHT: 62 IN | DIASTOLIC BLOOD PRESSURE: 61 MMHG | BODY MASS INDEX: 23.96 KG/M2 | OXYGEN SATURATION: 99 %

## 2022-09-07 DIAGNOSIS — Z3A.29 29 WEEKS GESTATION OF PREGNANCY: ICD-10-CM

## 2022-09-07 DIAGNOSIS — O09.93 HIGH-RISK PREGNANCY IN THIRD TRIMESTER: ICD-10-CM

## 2022-09-07 DIAGNOSIS — O09.893 HISTORY OF PRETERM DELIVERY, CURRENTLY PREGNANT IN THIRD TRIMESTER: Primary | ICD-10-CM

## 2022-09-07 DIAGNOSIS — Z98.891 HISTORY OF CESAREAN SECTION, LOW TRANSVERSE: ICD-10-CM

## 2022-09-07 DIAGNOSIS — O09.899 SINGLE UMBILICAL ARTERY AFFECTING MANAGEMENT OF MOTHER IN SINGLETON PREGNANCY, ANTEPARTUM: ICD-10-CM

## 2022-09-07 DIAGNOSIS — Z34.93 PRENATAL CARE IN THIRD TRIMESTER: ICD-10-CM

## 2022-09-07 PROCEDURE — 0502F SUBSEQUENT PRENATAL CARE: CPT | Performed by: OBSTETRICS & GYNECOLOGY

## 2022-09-07 NOTE — PROGRESS NOTES
Patient saw M on 8/23/22: EFW 2 pounds 7 ounces, 65th percentile at 27 weeks, single umbilical artery, transabdominal cervical length 44 mm. Discussed increased risk of fetal growth abnormalities due to single umbilical artery. Follow-up study recommended in 4 to 6 weeks. +FM. Denies LOF/VB. Irreg ctxs for the past 2 days. SVE closed. Urine dip neg for UTI. Initially accepted Tdap vaccine but then decided to reschedule it. GTT lab reprinted.  States CBC was drawn, will check with [facility].

## 2022-09-07 NOTE — PROGRESS NOTES
Chief Complaint   Patient presents with    Routine Prenatal Visit     1. \"Have you been to the ER, urgent care clinic since your last visit? Hospitalized since your last visit? \" No    2. \"Have you seen or consulted any other health care providers outside of the [address] [place] [address] since your last visit? \" No     3. For patients aged 39-70: Has the patient had a colonoscopy? NA - based on age     If the patient is female:    4. For patients aged 41-77: Has the patient had a mammogram within the past 2 years? NA - based on age    11. For patients aged 21-65: Has the patient had a pap smear?  Yes - no Care Gap present     Visit Vitals  /61 (BP 1 Location: Right arm, BP Patient Position: Sitting, BP Cuff Size: Adult)   Pulse 94   Temp 97.7 °F (36.5 °C) (Temporal)   Resp 18   Ht 5' 2\" (1.575 m)   Wt 130 lb 3.2 oz (59.1 kg)   LMP 02/15/2022 (Exact Date)   SpO2 99%   BMI 23.81 kg/m²

## 2022-09-20 ENCOUNTER — HOSPITAL ENCOUNTER (OUTPATIENT)
Age: 27
Discharge: HOME | End: 2022-09-20
Attending: OBSTETRICS & GYNECOLOGY | Admitting: OBSTETRICS & GYNECOLOGY
Payer: MEDICAID

## 2022-09-20 ENCOUNTER — ROUTINE PRENATAL (OUTPATIENT)
Dept: OBGYN CLINIC | Age: 27
End: 2022-09-20
Payer: MEDICAID

## 2022-09-20 VITALS
SYSTOLIC BLOOD PRESSURE: 119 MMHG | HEART RATE: 100 BPM | TEMPERATURE: 97.3 F | BODY MASS INDEX: 24.14 KG/M2 | DIASTOLIC BLOOD PRESSURE: 64 MMHG | WEIGHT: 132 LBS | OXYGEN SATURATION: 99 % | RESPIRATION RATE: 18 BRPM

## 2022-09-20 VITALS
WEIGHT: 138 LBS | DIASTOLIC BLOOD PRESSURE: 47 MMHG | HEIGHT: 62 IN | HEART RATE: 97 BPM | BODY MASS INDEX: 25.4 KG/M2 | SYSTOLIC BLOOD PRESSURE: 103 MMHG

## 2022-09-20 DIAGNOSIS — O09.893 HISTORY OF PRETERM DELIVERY, CURRENTLY PREGNANT IN THIRD TRIMESTER: ICD-10-CM

## 2022-09-20 DIAGNOSIS — N89.8 VAGINAL DISCHARGE DURING PREGNANCY IN THIRD TRIMESTER: ICD-10-CM

## 2022-09-20 DIAGNOSIS — O21.9 NAUSEA AND VOMITING DURING PREGNANCY: ICD-10-CM

## 2022-09-20 DIAGNOSIS — O26.893 VAGINAL DISCHARGE DURING PREGNANCY IN THIRD TRIMESTER: ICD-10-CM

## 2022-09-20 DIAGNOSIS — Z3A.31 31 WEEKS GESTATION OF PREGNANCY: ICD-10-CM

## 2022-09-20 DIAGNOSIS — O09.93 HIGH-RISK PREGNANCY IN THIRD TRIMESTER: ICD-10-CM

## 2022-09-20 DIAGNOSIS — Z34.93 PRENATAL CARE IN THIRD TRIMESTER: Primary | ICD-10-CM

## 2022-09-20 PROCEDURE — 59025 FETAL NON-STRESS TEST: CPT

## 2022-09-20 PROCEDURE — 0502F SUBSEQUENT PRENATAL CARE: CPT | Performed by: OBSTETRICS & GYNECOLOGY

## 2022-09-20 PROCEDURE — 96372 THER/PROPH/DIAG INJ SC/IM: CPT

## 2022-09-20 PROCEDURE — 99283 EMERGENCY DEPT VISIT LOW MDM: CPT

## 2022-09-20 PROCEDURE — 74011250636 HC RX REV CODE- 250/636: Performed by: OBSTETRICS & GYNECOLOGY

## 2022-09-20 RX ORDER — PRENATAL VIT 96/IRON FUM/FOLIC 27MG-0.8MG
1 TABLET ORAL DAILY
Status: ON HOLD | COMMUNITY
Start: 2022-08-09

## 2022-09-20 RX ORDER — ONDANSETRON 4 MG/1
4 TABLET, ORALLY DISINTEGRATING ORAL
Qty: 60 TABLET | Refills: 1 | Status: ON HOLD | OUTPATIENT
Start: 2022-09-20

## 2022-09-20 RX ORDER — BETAMETHASONE SODIUM PHOSPHATE AND BETAMETHASONE ACETATE 3; 3 MG/ML; MG/ML
12 INJECTION, SUSPENSION INTRA-ARTICULAR; INTRALESIONAL; INTRAMUSCULAR; SOFT TISSUE ONCE
Status: COMPLETED | OUTPATIENT
Start: 2022-09-20 | End: 2022-09-20

## 2022-09-20 RX ADMIN — BETAMETHASONE SODIUM PHOSPHATE AND BETAMETHASONE ACETATE 12 MG: 3; 3 INJECTION, SUSPENSION INTRA-ARTICULAR; INTRALESIONAL; INTRAMUSCULAR at 18:30

## 2022-09-20 NOTE — PROGRESS NOTES
1900: Shift report received from [address] [place] [address]. Assume care of pt.  1904: Pt alert in bed watching television, denies pain or other complaints at this time. S/O in room with pt.     1910: Pt completed 2 small bottles of water. More water given for po hydration    1934 -1940: RN at bedside -150s.   2001: Dr. Natalia Gross at nurses station and reviewed strip, NST reactive. Discharge order received. 2005:Pt updated re plan of care for discharge, reactive NST. Pt to return for next dose of steroid tomorrow. 2018: Discharge instructions discussed with pt, went over signs of labor, and when to call provider. Fetal kick count also reviewed. Pt encouraged to rest, avoid strenuous activities, keep hydrated and to call office for follow up appointment. Pt is to return 9/21/22 at 1800 for second dose of betamethasone, reminded about the importance of same. Pt verbalized understanding. 2022: Pt ambulated off unit by choice for home, printed discharge instructions given.

## 2022-09-20 NOTE — PROGRESS NOTES
Chief Complaint   Patient presents with    Routine Prenatal Visit     1. \"Have you been to the ER, urgent care clinic since your last visit? Hospitalized since your last visit? \" No    2. \"Have you seen or consulted any other health care providers outside of the [address] [place] [address] since your last visit? \" No     3. For patients aged 39-70: Has the patient had a colonoscopy? NA - based on age     If the patient is female:    4. For patients aged 41-77: Has the patient had a mammogram within the past 2 years? NA - based on age    11. For patients aged 21-65: Has the patient had a pap smear?  Yes - no Care Gap present    Visit Vitals  /64 (BP 1 Location: Right arm, BP Patient Position: Sitting, BP Cuff Size: Adult)   Pulse 100   Temp 97.3 °F (36.3 °C) (Temporal)   Resp 18   Wt 132 lb (59.9 kg)   LMP 02/15/2022 (Exact Date)   SpO2 99%   BMI 24.14 kg/m²

## 2022-09-20 NOTE — PROGRESS NOTES
+FM. Denies LOF/VB. Low back pain and thick mucus like discharge from vagina. Speculum: thick clear/white mucus discharge seen, swab obtained. SVE 1.5-2cm/th/-3. H/o PTL. Will send to l&d for BMZ and NST.  L&D and Dr Daja Harris notified. Has not yet done 2nd trim labs. Tdap vaccine today. Has MFM appt Oct 4th, single umbilical artery.

## 2022-09-21 ENCOUNTER — HOSPITAL ENCOUNTER (OUTPATIENT)
Age: 27
Discharge: HOME | End: 2022-09-21
Attending: OBSTETRICS & GYNECOLOGY | Admitting: OBSTETRICS & GYNECOLOGY
Payer: MEDICAID

## 2022-09-21 VITALS
TEMPERATURE: 98.5 F | DIASTOLIC BLOOD PRESSURE: 67 MMHG | WEIGHT: 138 LBS | HEART RATE: 115 BPM | SYSTOLIC BLOOD PRESSURE: 113 MMHG | RESPIRATION RATE: 16 BRPM | BODY MASS INDEX: 25.4 KG/M2 | OXYGEN SATURATION: 99 % | HEIGHT: 62 IN

## 2022-09-21 PROCEDURE — 99283 EMERGENCY DEPT VISIT LOW MDM: CPT

## 2022-09-21 PROCEDURE — 96372 THER/PROPH/DIAG INJ SC/IM: CPT

## 2022-09-21 PROCEDURE — 74011250636 HC RX REV CODE- 250/636: Performed by: OBSTETRICS & GYNECOLOGY

## 2022-09-21 RX ORDER — BETAMETHASONE SODIUM PHOSPHATE AND BETAMETHASONE ACETATE 3; 3 MG/ML; MG/ML
12 INJECTION, SUSPENSION INTRA-ARTICULAR; INTRALESIONAL; INTRAMUSCULAR; SOFT TISSUE ONCE
Status: COMPLETED | OUTPATIENT
Start: 2022-09-21 | End: 2022-09-21

## 2022-09-21 RX ADMIN — BETAMETHASONE SODIUM PHOSPHATE AND BETAMETHASONE ACETATE 12 MG: 3; 3 INJECTION, SUSPENSION INTRA-ARTICULAR; INTRALESIONAL; INTRAMUSCULAR at 20:08

## 2022-09-22 NOTE — DISCHARGE INSTRUCTIONS
Notify your doctor if you have any of the following:  -Painful contractions that occur every 5 minutes for greater than one hour  -Vaginal bleeding that saturates more than one pad per hour  -Vaginal discharge or leaking  -If you feel like your water has broken  -If you are not feeling your baby move  -Any other questions or concerns. If it is after hours or the weekend, you can call the [facility] on call OB doctor at  [phone]. **If you are having a medical emergency, go to your nearest emergency department. **      Continue to perform fetal kick counts as instructed by your doctor. You should be drinking 10-14 glasses of water per day to stay properly hydrated.

## 2022-09-22 NOTE — PROGRESS NOTES
1920- Pt ambulated onto unit. Pt presented to be administered second dose of betamethasone. Pt was directed to LD4 and asked to change into hospital gown. 1933- Pt was placed on FHR and contraction monitoring. 2008- 12mg Betamethasone was administered IM to pt.    2134- Dr. Sandra Mcgrath contacted via phone regarding pt. Order given for discharge. 2150- Writer in to provide pt with discharge education and instructions (kick counts, pregnancy precautions, and when to call doctor). Pt denied any questions at this time. 2154- Pt declined the use of wheelchair, and ambulated off of unit.

## 2022-09-23 ENCOUNTER — HOSPITAL ENCOUNTER (OUTPATIENT)
Age: 27
Discharge: HOME | End: 2022-09-24
Attending: OBSTETRICS & GYNECOLOGY | Admitting: OBSTETRICS & GYNECOLOGY
Payer: MEDICAID

## 2022-09-23 VITALS — WEIGHT: 138 LBS | BODY MASS INDEX: 25.4 KG/M2 | TEMPERATURE: 98.6 F | RESPIRATION RATE: 18 BRPM | HEIGHT: 62 IN

## 2022-09-23 PROBLEM — M54.9 BACK PAIN AFFECTING PREGNANCY: Status: ACTIVE | Noted: 2022-09-23

## 2022-09-23 PROBLEM — O99.891 BACK PAIN AFFECTING PREGNANCY: Status: ACTIVE | Noted: 2022-09-23

## 2022-09-23 PROBLEM — R10.9 ABDOMINAL PAIN AFFECTING PREGNANCY: Status: ACTIVE | Noted: 2022-09-23

## 2022-09-23 PROBLEM — O26.899 ABDOMINAL PAIN AFFECTING PREGNANCY: Status: ACTIVE | Noted: 2022-09-23

## 2022-09-23 LAB
A VAGINAE DNA VAG QL NAA+PROBE: NORMAL SCORE
APPEARANCE UR: ABNORMAL
BACTERIA URNS QL MICRO: NEGATIVE /HPF
BILIRUB UR QL: NEGATIVE
BVAB2 DNA VAG QL NAA+PROBE: NORMAL SCORE
C ALBICANS DNA VAG QL NAA+PROBE: NEGATIVE
C GLABRATA DNA VAG QL NAA+PROBE: NEGATIVE
C KRUSEI DNA VAG QL NAA+PROBE: NEGATIVE
C LUSITANIAE DNA VAG QL NAA+PROBE: NEGATIVE
C TRACH DNA VAG QL NAA+PROBE: NEGATIVE
CANDIDA DNA VAG QL NAA+PROBE: NEGATIVE
COLOR UR: ABNORMAL
GLUCOSE UR STRIP.AUTO-MCNC: NEGATIVE MG/DL
HGB UR QL STRIP: NEGATIVE
KETONES UR QL STRIP.AUTO: NEGATIVE MG/DL
LEUKOCYTE ESTERASE UR QL STRIP.AUTO: ABNORMAL
MEGA1 DNA VAG QL NAA+PROBE: NORMAL SCORE
MUCOUS THREADS URNS QL MICRO: ABNORMAL /LPF
N GONORRHOEA DNA VAG QL NAA+PROBE: NEGATIVE
NITRITE UR QL STRIP.AUTO: NEGATIVE
PH UR STRIP: 7 [PH] (ref 5–8)
PROT UR STRIP-MCNC: NEGATIVE MG/DL
RBC #/AREA URNS HPF: ABNORMAL /HPF (ref 0–5)
SP GR UR REFRACTOMETRY: 1.02 (ref 1–1.03)
T VAGINALIS DNA VAG QL NAA+PROBE: NEGATIVE
UROBILINOGEN UR QL STRIP.AUTO: 0.1 EU/DL (ref 0.1–1)
WBC URNS QL MICRO: ABNORMAL /HPF (ref 0–4)

## 2022-09-23 PROCEDURE — 75810000275 HC EMERGENCY DEPT VISIT NO LEVEL OF CARE

## 2022-09-23 PROCEDURE — 81001 URINALYSIS AUTO W/SCOPE: CPT

## 2022-09-24 PROCEDURE — 99283 EMERGENCY DEPT VISIT LOW MDM: CPT

## 2022-09-24 NOTE — DISCHARGE INSTRUCTIONS
Back Pain During Pregnancy: Care Instructions  Overview     Back pain has many possible causes. It is often caused by problems with muscles and ligaments in your back. The extra weight during pregnancy can put stress on your back. Moving, lifting, standing, sitting, or sleeping in an awkward way also can strain your back. Back pain can also be a sign of labor. Although it may hurt a lot, back pain often improves on its own. Use good home treatment, and take care not to stress your back. Follow-up care is a key part of your treatment and safety. Be sure to make and go to all appointments, and call your doctor if you are having problems. It's also a good idea to know your test results and keep a list of the medicines you take. How can you care for yourself at home? Ask your doctor about taking acetaminophen (Tylenol) for pain. Do not take aspirin, ibuprofen (Advil, Motrin), or naproxen (Aleve). Do not take two or more pain medicines at the same time unless the doctor told you to. Many pain medicines have acetaminophen, which is Tylenol. Too much acetaminophen (Tylenol) can be harmful. Lie on your side with your knees and hips bent and a pillow between your legs. This reduces stress on your back. Put ice or cold packs on your back for 10 to 20 minutes at a time, several times a day. Put a thin cloth between the ice and your skin. Warm baths may also help reduce pain. Change positions every 30 minutes. Take breaks if you must sit for a long time. Get up and walk around. Ask your doctor about how much exercise you can do. You may feel better taking short walks or doing gentle movements and stretching in a swimming pool. Ask your doctor about exercises to stretch and strengthen your back. When should you call for help? Call your doctor now or seek immediate medical care if:    You think you are in labor. You have new numbness in your buttocks, genital or rectal areas, or legs.      You have a new loss of bowel or bladder control. Watch closely for changes in your health, and be sure to contact your doctor if:    You do not get better as expected. Where can you learn more? Go to http://www.gray.com/  Enter B867 in the search box to learn more about \"Back Pain During Pregnancy: Care Instructions. \"  Current as of: June 16, 2021               Content Version: 13.2  © 2006-2022 [employer]. Care instructions adapted under license by [employer] (which disclaims liability or warranty for this information). If you have questions about a medical condition or this instruction, always ask your healthcare professional. [place] [address] any warranty or liability for your use of this information. Belly Pain in Pregnancy: Care Instructions  Overview     When you're pregnant, any belly pain can be a worry. You may not want to call your doctor or midwife about every pain you have. But you don't want to miss something that is dangerous for you or your baby. Even if it feels familiar, belly pain can mean something new when you're pregnant. It's important to know when to call your doctor or midwife. It will also help to know how to care for yourself at home when your pain is not caused by anything harmful. When belly pain is more severe or constant, see a doctor or midwife right away. If you're sure your belly pain is a sign of labor, call your doctor or midwife. When belly pain is brief, it's usually a normal part of pregnancy. It might be related to changes in the growing uterus. Or it could be the stretching of ligaments called round ligaments. These ligaments help support the uterus. Round ligament pain can be on either side of your belly. It can also be felt in your hips or groin. Follow-up care is a key part of your treatment and safety. Be sure to make and go to all appointments, and call your doctor if you are having problems.  It's also a good idea to know your test results and keep a list of the medicines you take. How can you tell if belly pain is a sign of labor? When belly pain is caused by labor, it can feel like mild or menstrual-like cramps in your lower belly. These cramps are probably contractions. They can happen in your second or third trimester. You may also have:  A steady, dull ache in your lower back, pelvis, or thighs. A feeling of pressure in your pelvis or lower belly. Changes in your vaginal discharge or a sudden release of fluid from the vagina. If you think you are in labor, call your doctor. How can you care for yourself at home? When belly pain is mild and is not a symptom of labor:  Rest until you feel better. Take a warm bath. Think about what you drink and eat:  Drink plenty of fluids. Choose water and other clear liquids until you feel better. Try eating small, frequent meals. If your stomach is upset, try bland, low-fat foods like plain rice, broiled chicken, toast, and yogurt. Think about how you move if you are having brief pains from stretching of the round ligaments. Try gentle stretching. Move a little more slowly when turning in bed or getting up from a chair, so those ligaments don't stretch quickly. Lean forward a bit if you think you are going to cough or sneeze. When should you call for help? Call 911  anytime you think you may need emergency care. For example, call if:    You have sudden, severe pain in your belly. You have severe vaginal bleeding. You passed out (lost consciousness). You have a seizure. Call your doctor now or seek immediate medical care if:    You have new or worse belly pain or cramping. You have any vaginal bleeding. You have a fever. You have symptoms of preeclampsia, such as:  Sudden swelling of your face, hands, or feet. New vision problems (such as dimness, blurring, or seeing spots). A severe headache. You think that you may be in labor. This means that you've had at least 8 contractions within 1 hour or at least 4 contractions within 20 minutes, even after you change your position and drink fluids. You have symptoms of a urinary tract infection. These may include:  Pain or burning when you urinate. A frequent need to urinate without being able to pass much urine. Pain in the flank, which is just below the rib cage and above the waist on either side of the back. Blood in your urine. Watch closely for changes in your health, and be sure to contact your doctor if you are worried about your or your baby's health. Where can you learn more? Go to http://www.[employer].com/  Enter B275 in the search box to learn more about \"Belly Pain in Pregnancy: Care Instructions. \"  Current as of: June 16, 2021               Content Version: 13.2  © 9743-0204 [employer]. Care instructions adapted under license by [employer] (which disclaims liability or warranty for this information). If you have questions about a medical condition or this instruction, always ask your healthcare professional. [place] [address] any warranty or liability for your use of this information.

## 2022-09-24 NOTE — PROGRESS NOTES
2050  Pt to LD4 with c/o constant lower abdominal and lower back pain, rating pressure pain at level 6/10. Pt states pains started at approximately 10 a.m. that worsened throughout the day. Pt states she also feels contractions (tightening) approximately every 5 minutes that started at approximately noon today. Pt also states milky white vaginal discharge (no odor or itching) that started at 1820.    2125  Pt states she feels better since she's been here but still rates pressure pain at level 6/10.    2130  Dr. Bryn Negrete made aware of pt arrival, pt complaints as documented, assessment and history. Order received.

## 2022-10-20 ENCOUNTER — HOSPITAL ENCOUNTER (OUTPATIENT)
Age: 27
Discharge: LEFT BEFORE DISCHARGE | End: 2022-10-21
Attending: OBSTETRICS & GYNECOLOGY | Admitting: OBSTETRICS & GYNECOLOGY
Payer: MEDICAID

## 2022-10-20 PROCEDURE — 81001 URINALYSIS AUTO W/SCOPE: CPT

## 2022-10-20 PROCEDURE — 85025 COMPLETE CBC W/AUTO DIFF WBC: CPT

## 2022-10-20 PROCEDURE — 74011250636 HC RX REV CODE- 250/636: Performed by: OBSTETRICS & GYNECOLOGY

## 2022-10-20 PROCEDURE — 75810000275 HC EMERGENCY DEPT VISIT NO LEVEL OF CARE

## 2022-10-20 RX ADMIN — SODIUM CHLORIDE, POTASSIUM CHLORIDE, SODIUM LACTATE AND CALCIUM CHLORIDE 1000 ML: 600; 310; 30; 20 INJECTION, SOLUTION INTRAVENOUS at 23:55

## 2022-10-21 VITALS
HEART RATE: 111 BPM | DIASTOLIC BLOOD PRESSURE: 78 MMHG | TEMPERATURE: 98.2 F | RESPIRATION RATE: 18 BRPM | SYSTOLIC BLOOD PRESSURE: 130 MMHG

## 2022-10-21 PROBLEM — O09.33 LIMITED PRENATAL CARE IN THIRD TRIMESTER: Status: ACTIVE | Noted: 2022-10-21

## 2022-10-21 PROBLEM — Z53.29 LEFT AGAINST MEDICAL ADVICE: Status: ACTIVE | Noted: 2022-10-21

## 2022-10-21 LAB
APPEARANCE UR: ABNORMAL
BACTERIA URNS QL MICRO: NEGATIVE /HPF
BASOPHILS # BLD: 0.1 K/UL (ref 0–0.1)
BASOPHILS NFR BLD: 0 % (ref 0–1)
BILIRUB UR QL: NEGATIVE
COLOR UR: ABNORMAL
DIFFERENTIAL METHOD BLD: ABNORMAL
EOSINOPHIL # BLD: 0.1 K/UL (ref 0–0.4)
EOSINOPHIL NFR BLD: 1 % (ref 0–7)
ERYTHROCYTE [DISTWIDTH] IN BLOOD BY AUTOMATED COUNT: 15.9 % (ref 11.5–14.5)
GLUCOSE UR STRIP.AUTO-MCNC: NEGATIVE MG/DL
HCT VFR BLD AUTO: 31.5 % (ref 35–47)
HGB BLD-MCNC: 10 G/DL (ref 11.5–16)
HGB UR QL STRIP: NEGATIVE
IMM GRANULOCYTES # BLD AUTO: 0.2 K/UL (ref 0–0.04)
IMM GRANULOCYTES NFR BLD AUTO: 2 % (ref 0–0.5)
KETONES UR QL STRIP.AUTO: NEGATIVE MG/DL
LEUKOCYTE ESTERASE UR QL STRIP.AUTO: ABNORMAL
LYMPHOCYTES # BLD: 2.5 K/UL (ref 0.8–3.5)
LYMPHOCYTES NFR BLD: 23 % (ref 12–49)
MCH RBC QN AUTO: 24.6 PG (ref 26–34)
MCHC RBC AUTO-ENTMCNC: 31.7 G/DL (ref 30–36.5)
MCV RBC AUTO: 77.4 FL (ref 80–99)
MONOCYTES # BLD: 0.8 K/UL (ref 0–1)
MONOCYTES NFR BLD: 7 % (ref 5–13)
MUCOUS THREADS URNS QL MICRO: ABNORMAL /LPF
NEUTS SEG # BLD: 7.5 K/UL (ref 1.8–8)
NEUTS SEG NFR BLD: 67 % (ref 32–75)
NITRITE UR QL STRIP.AUTO: NEGATIVE
NRBC # BLD: 0 K/UL (ref 0–0.01)
NRBC BLD-RTO: 0 PER 100 WBC
PH UR STRIP: 7 [PH] (ref 5–8)
PLATELET # BLD AUTO: 194 K/UL (ref 150–400)
PMV BLD AUTO: 11.2 FL (ref 8.9–12.9)
PROT UR STRIP-MCNC: NEGATIVE MG/DL
RBC # BLD AUTO: 4.07 M/UL (ref 3.8–5.2)
RBC #/AREA URNS HPF: ABNORMAL /HPF (ref 0–5)
SP GR UR REFRACTOMETRY: 1.02 (ref 1–1.03)
UROBILINOGEN UR QL STRIP.AUTO: 0.1 EU/DL (ref 0.1–1)
WBC # BLD AUTO: 11.1 K/UL (ref 3.6–11)
WBC URNS QL MICRO: ABNORMAL /HPF (ref 0–4)

## 2022-10-21 PROCEDURE — 99213 OFFICE O/P EST LOW 20 MIN: CPT | Performed by: OBSTETRICS & GYNECOLOGY

## 2022-10-21 PROCEDURE — 99285 EMERGENCY DEPT VISIT HI MDM: CPT

## 2022-10-21 PROCEDURE — 96360 HYDRATION IV INFUSION INIT: CPT

## 2022-10-21 NOTE — PROGRESS NOTES
[address] [place] [address] assumed care of pt. Pt states constant left lower abdomen/groin pain and bilateral lower back/hip pains. Pt rates pain at level 7/10.

## 2022-10-21 NOTE — PROGRESS NOTES
OB PROGRESS NOTE /OUTPATIENT NOTE    DATE OF SERVICE: 21 OCT 2022    CHIEF COMPLAINT: Suspected labor    PROBLEM:  Intrauterine pregnancy at 35-3/7-week gestation  Limited prenatal care  History of  section  History of  labor    SUBJECTIVE: Patient presented to L&D with complaints of pelvic pressure/contractions.     OBJECTIVE:    VITALS:  Visit Vitals  /78   Pulse (!) 111   Temp 98.2 °F (36.8 °C)   Resp 18      HEART: Regular rhythm, no murmur  LUNGS: Clear to auscultation at both fields  ABDOMEN: Gravid, fetal heart tones present  PELVIC: Cervical dilation: 1 to 2 cm, effacement: 40 to 60%, presentation: Vertex, Station: -3, membranes: Intact    LABS:  Recent Results (from the past 24 hour(s))   URINALYSIS W/MICROSCOPIC    Collection Time: 10/20/22 11:30 PM   Result Value Ref Range    Color Yellow/Straw      Appearance Turbid (A) Clear      Specific gravity 1.017 1.003 - 1.030      pH (UA) 7.0 5.0 - 8.0      Protein Negative Negative mg/dL    Glucose Negative Negative mg/dL    Ketone Negative Negative mg/dL    Bilirubin Negative Negative      Blood Negative Negative      Urobilinogen 0.1 0.1 - 1.0 EU/dL    Nitrites Negative Negative      Leukocyte Esterase Moderate (A) Negative      WBC 0-4 0 - 4 /hpf    RBC 0-5 0 - 5 /hpf    Bacteria Negative Negative /hpf    Mucus 1+ /lpf   CBC WITH AUTOMATED DIFF    Collection Time: 10/20/22 11:55 PM   Result Value Ref Range    WBC 11.1 (H) 3.6 - 11.0 K/uL    RBC 4.07 3.80 - 5.20 M/uL    HGB 10.0 (L) 11.5 - 16.0 g/dL    HCT 31.5 (L) 35.0 - 47.0 %    MCV 77.4 (L) 80.0 - 99.0 FL    MCH 24.6 (L) 26.0 - 34.0 PG    MCHC 31.7 30.0 - 36.5 g/dL    RDW 15.9 (H) 11.5 - 14.5 %    PLATELET 865 471 - 070 K/uL    MPV 11.2 8.9 - 12.9 FL    NRBC 0.0 0.0  WBC    ABSOLUTE NRBC 0.00 0.00 - 0.01 K/uL    NEUTROPHILS 67 32 - 75 %    LYMPHOCYTES 23 12 - 49 %    MONOCYTES 7 5 - 13 %    EOSINOPHILS 1 0 - 7 %    BASOPHILS 0 0 - 1 %    IMMATURE GRANULOCYTES 2 (H) 0 - 0.5 % ABS. NEUTROPHILS 7.5 1.8 - 8.0 K/UL    ABS. LYMPHOCYTES 2.5 0.8 - 3.5 K/UL    ABS. MONOCYTES 0.8 0.0 - 1.0 K/UL    ABS. EOSINOPHILS 0.1 0.0 - 0.4 K/UL    ABS. BASOPHILS 0.1 0.0 - 0.1 K/UL    ABS. IMM. GRANS. 0.2 (H) 0.00 - 0.04 K/UL    DF AUTOMATED        ASSESSMENT: 27years old -2-2-3 with pregnancy at 35-3/7 weeks gestation with late and limited prenatal care presented to L&D with complaints of pelvic pressure/contractions. Patient complains of pain on scale 7 out of 10. PLAN:   In view of patient history of  delivery, previous history of  section and complains of pelvic pain, hydration, laboratory work-up and observation for several hours was recommended. Patient declines any further treatment and request to leave AMA. Patient oriented about risk of  delivery, uterine rupture, hemorrhage, death, etc.  She was encouraged to be compliant with her prenatal outpatient follow-up.

## 2022-10-21 NOTE — PROGRESS NOTES
6922  Dr. Jim Estrella made aware IVF bolus has infused. Pt pain is unchanged. UC's have spaced out some. Dr. Jim Estrella to come to assess pt.    Syl Estrella to nurses' station; viewed strip; then, to pt room. 5  Dr. Jim Estrella spoke with pt about complaints of pain and performed SVE. Pt tolerated well.    0109  Plan of care discussed with pt via Dr. Jim Estrella:  monitor till a.m.; recheck cervix at approximately 0600; IVF; labwork. Pt verbalized understanding. Writer inquired if pt wanted pain medication while physician present. Pt declined, stating she doesn't like to take medication. Writer inquired if pt or S.O. had any questions or concerns for physician or nurse at this time. Pt stated, \"No\". Dr. Jim Estrella explained to pt that he didn't feel like she was in labor but he wanted to keep her till morning and recheck her cervix to make certain because of her h/o PTD with both prior deliveries (35 weeks and 27 weeks). 18  Pt called for physician to return to her room. 9095  Dr. Jim Estrella returned to nurses' station stating pt wants to leave AMA.    0122  AMA form signed via pt.    0130  Pt to ED via w/c in good condition with S.O. and writer at her side. S.O. states, \"I think she'll be fine. We just want to get home to the kids\". [facility], Nursing Supervisor, made aware of AMA departure.

## 2022-10-21 NOTE — PROGRESS NOTES
2325: pt arrived from ED via wheelchair accompanied by S.O. pt being triaged from increased pelvic pressure and back pain that she states started last night. Pt denies any vaginal bleeding. Pt states she has had some \"clear mucus like' discharge that has been happening \"for some time\" pt also endorses fetal movement. Pt assisted to the bathroom to change into a hospital gown and to leave a UA    2345: MD notified of pt's arrival, updated MD on pt's background and EFM.  Orders received to hydrate the patient with a Liter Bolus and MD states will come see the patient after she receives her fluids

## 2022-11-01 ENCOUNTER — TELEPHONE (OUTPATIENT)
Dept: OBGYN CLINIC | Age: 27
End: 2022-11-01

## 2022-11-01 NOTE — TELEPHONE ENCOUNTER
Patient called stated she woke up and was having contractions. States when she went to bathroom and wiped states she noticed blood and green mucus. Advised patient to go to L&D. Patient has history of  labor informed L&D patient would be coming.

## 2022-11-03 ENCOUNTER — ANESTHESIA (OUTPATIENT)
Dept: LABOR AND DELIVERY | Age: 27
DRG: 540 | End: 2022-11-03
Payer: MEDICAID

## 2022-11-03 ENCOUNTER — ANESTHESIA EVENT (OUTPATIENT)
Dept: LABOR AND DELIVERY | Age: 27
DRG: 540 | End: 2022-11-03
Payer: MEDICAID

## 2022-11-03 ENCOUNTER — HOSPITAL ENCOUNTER (INPATIENT)
Age: 27
LOS: 3 days | Discharge: HOME | DRG: 540 | End: 2022-11-06
Attending: OBSTETRICS & GYNECOLOGY | Admitting: OBSTETRICS & GYNECOLOGY
Payer: MEDICAID

## 2022-11-03 DIAGNOSIS — Z98.891 STATUS POST REPEAT LOW TRANSVERSE CESAREAN SECTION: Primary | ICD-10-CM

## 2022-11-03 PROBLEM — Z3A.37 37 WEEKS GESTATION OF PREGNANCY: Status: ACTIVE | Noted: 2022-11-03

## 2022-11-03 LAB
ABO + RH BLD: NORMAL
BASOPHILS # BLD: 0.1 K/UL (ref 0–0.1)
BASOPHILS NFR BLD: 1 % (ref 0–1)
BLOOD GROUP ANTIBODIES SERPL: NEGATIVE
DIFFERENTIAL METHOD BLD: ABNORMAL
EOSINOPHIL # BLD: 0.1 K/UL (ref 0–0.4)
EOSINOPHIL NFR BLD: 1 % (ref 0–7)
ERYTHROCYTE [DISTWIDTH] IN BLOOD BY AUTOMATED COUNT: 15.8 % (ref 11.5–14.5)
HCT VFR BLD AUTO: 35.7 % (ref 35–47)
HGB BLD-MCNC: 11.5 G/DL (ref 11.5–16)
IMM GRANULOCYTES # BLD AUTO: 0.1 K/UL (ref 0–0.04)
IMM GRANULOCYTES NFR BLD AUTO: 1 % (ref 0–0.5)
LYMPHOCYTES # BLD: 3.2 K/UL (ref 0.8–3.5)
LYMPHOCYTES NFR BLD: 25 % (ref 12–49)
MCH RBC QN AUTO: 24.7 PG (ref 26–34)
MCHC RBC AUTO-ENTMCNC: 32.2 G/DL (ref 30–36.5)
MCV RBC AUTO: 76.8 FL (ref 80–99)
MONOCYTES # BLD: 0.8 K/UL (ref 0–1)
MONOCYTES NFR BLD: 6 % (ref 5–13)
NEUTS SEG # BLD: 8.4 K/UL (ref 1.8–8)
NEUTS SEG NFR BLD: 66 % (ref 32–75)
NRBC # BLD: 0 K/UL (ref 0–0.01)
NRBC BLD-RTO: 0 PER 100 WBC
PLATELET # BLD AUTO: 199 K/UL (ref 150–400)
PMV BLD AUTO: 11.1 FL (ref 8.9–12.9)
RBC # BLD AUTO: 4.65 M/UL (ref 3.8–5.2)
SPECIMEN EXP DATE BLD: NORMAL
WBC # BLD AUTO: 12.6 K/UL (ref 3.6–11)

## 2022-11-03 PROCEDURE — 76010000391 HC C SECN FIRST 1 HR: Performed by: OBSTETRICS & GYNECOLOGY

## 2022-11-03 PROCEDURE — 86900 BLOOD TYPING SEROLOGIC ABO: CPT

## 2022-11-03 PROCEDURE — 99283 EMERGENCY DEPT VISIT LOW MDM: CPT

## 2022-11-03 PROCEDURE — 74011000258 HC RX REV CODE- 258: Performed by: ANESTHESIOLOGY

## 2022-11-03 PROCEDURE — 74011250636 HC RX REV CODE- 250/636: Performed by: NURSE ANESTHETIST, CERTIFIED REGISTERED

## 2022-11-03 PROCEDURE — 76060000078 HC EPIDURAL ANESTHESIA: Performed by: OBSTETRICS & GYNECOLOGY

## 2022-11-03 PROCEDURE — 75410000002 HC LABOR FEE PER 1 HR

## 2022-11-03 PROCEDURE — 76010000392 HC C SECN EA ADDL 0.5 HR: Performed by: OBSTETRICS & GYNECOLOGY

## 2022-11-03 PROCEDURE — 85025 COMPLETE CBC W/AUTO DIFF WBC: CPT

## 2022-11-03 PROCEDURE — 74011250636 HC RX REV CODE- 250/636

## 2022-11-03 PROCEDURE — 74011250636 HC RX REV CODE- 250/636: Performed by: ANESTHESIOLOGY

## 2022-11-03 PROCEDURE — 74011000250 HC RX REV CODE- 250: Performed by: ANESTHESIOLOGY

## 2022-11-03 PROCEDURE — 65410000002 HC RM PRIVATE OB

## 2022-11-03 PROCEDURE — 74011250636 HC RX REV CODE- 250/636: Performed by: OBSTETRICS & GYNECOLOGY

## 2022-11-03 PROCEDURE — 74011250637 HC RX REV CODE- 250/637: Performed by: OBSTETRICS & GYNECOLOGY

## 2022-11-03 PROCEDURE — 75810000275 HC EMERGENCY DEPT VISIT NO LEVEL OF CARE

## 2022-11-03 PROCEDURE — 76060000033 HC ANESTHESIA 1 TO 1.5 HR: Performed by: OBSTETRICS & GYNECOLOGY

## 2022-11-03 PROCEDURE — 75410000003 HC RECOV DEL/VAG/CSECN EA 0.5 HR

## 2022-11-03 PROCEDURE — 36415 COLL VENOUS BLD VENIPUNCTURE: CPT

## 2022-11-03 RX ORDER — DOCUSATE SODIUM 100 MG/1
100 CAPSULE, LIQUID FILLED ORAL 2 TIMES DAILY
Status: DISCONTINUED | OUTPATIENT
Start: 2022-11-03 | End: 2022-11-06 | Stop reason: HOSPADM

## 2022-11-03 RX ORDER — OXYCODONE HYDROCHLORIDE 5 MG/1
5 TABLET ORAL
Status: DISCONTINUED | OUTPATIENT
Start: 2022-11-03 | End: 2022-11-06 | Stop reason: HOSPADM

## 2022-11-03 RX ORDER — IBUPROFEN 800 MG/1
800 TABLET ORAL EVERY 8 HOURS
Status: DISCONTINUED | OUTPATIENT
Start: 2022-11-03 | End: 2022-11-06 | Stop reason: HOSPADM

## 2022-11-03 RX ORDER — OXYTOCIN/RINGER'S LACTATE 30/500 ML
87.3 PLASTIC BAG, INJECTION (ML) INTRAVENOUS AS NEEDED
Status: COMPLETED | OUTPATIENT
Start: 2022-11-03 | End: 2022-11-03

## 2022-11-03 RX ORDER — ONDANSETRON 2 MG/ML
4 INJECTION INTRAMUSCULAR; INTRAVENOUS
Status: DISCONTINUED | OUTPATIENT
Start: 2022-11-03 | End: 2022-11-06 | Stop reason: HOSPADM

## 2022-11-03 RX ORDER — MORPHINE SULFATE 0.5 MG/ML
INJECTION, SOLUTION EPIDURAL; INTRATHECAL; INTRAVENOUS
Status: SHIPPED | OUTPATIENT
Start: 2022-11-03 | End: 2022-11-03

## 2022-11-03 RX ORDER — NALOXONE HYDROCHLORIDE 0.4 MG/ML
0.4 INJECTION, SOLUTION INTRAMUSCULAR; INTRAVENOUS; SUBCUTANEOUS AS NEEDED
Status: DISCONTINUED | OUTPATIENT
Start: 2022-11-03 | End: 2022-11-06 | Stop reason: HOSPADM

## 2022-11-03 RX ORDER — SODIUM CHLORIDE 0.9 % (FLUSH) 0.9 %
5-40 SYRINGE (ML) INJECTION EVERY 8 HOURS
Status: DISCONTINUED | OUTPATIENT
Start: 2022-11-03 | End: 2022-11-06 | Stop reason: HOSPADM

## 2022-11-03 RX ORDER — OXYTOCIN/RINGER'S LACTATE 30/500 ML
10 PLASTIC BAG, INJECTION (ML) INTRAVENOUS AS NEEDED
Status: DISCONTINUED | OUTPATIENT
Start: 2022-11-03 | End: 2022-11-06 | Stop reason: HOSPADM

## 2022-11-03 RX ORDER — ONDANSETRON 2 MG/ML
INJECTION INTRAMUSCULAR; INTRAVENOUS
Status: COMPLETED
Start: 2022-11-03 | End: 2022-11-03

## 2022-11-03 RX ORDER — OXYTOCIN/RINGER'S LACTATE 30/500 ML
PLASTIC BAG, INJECTION (ML) INTRAVENOUS
Status: DISCONTINUED
Start: 2022-11-03 | End: 2022-11-03 | Stop reason: WASHOUT

## 2022-11-03 RX ORDER — KETOROLAC TROMETHAMINE 30 MG/ML
INJECTION, SOLUTION INTRAMUSCULAR; INTRAVENOUS AS NEEDED
Status: DISCONTINUED | OUTPATIENT
Start: 2022-11-03 | End: 2022-11-03 | Stop reason: HOSPADM

## 2022-11-03 RX ORDER — SODIUM CHLORIDE, SODIUM LACTATE, POTASSIUM CHLORIDE, CALCIUM CHLORIDE 600; 310; 30; 20 MG/100ML; MG/100ML; MG/100ML; MG/100ML
1000 INJECTION, SOLUTION INTRAVENOUS CONTINUOUS
Status: DISCONTINUED | OUTPATIENT
Start: 2022-11-03 | End: 2022-11-03

## 2022-11-03 RX ORDER — SIMETHICONE 80 MG
80 TABLET,CHEWABLE ORAL AS NEEDED
Status: DISCONTINUED | OUTPATIENT
Start: 2022-11-03 | End: 2022-11-06 | Stop reason: HOSPADM

## 2022-11-03 RX ORDER — CEFAZOLIN SODIUM 1 G/3ML
INJECTION, POWDER, FOR SOLUTION INTRAMUSCULAR; INTRAVENOUS AS NEEDED
Status: DISCONTINUED | OUTPATIENT
Start: 2022-11-03 | End: 2022-11-03 | Stop reason: HOSPADM

## 2022-11-03 RX ORDER — SODIUM CHLORIDE 0.9 % (FLUSH) 0.9 %
5-40 SYRINGE (ML) INJECTION AS NEEDED
Status: DISCONTINUED | OUTPATIENT
Start: 2022-11-03 | End: 2022-11-03

## 2022-11-03 RX ORDER — BUPIVACAINE HYDROCHLORIDE 7.5 MG/ML
INJECTION, SOLUTION EPIDURAL; RETROBULBAR
Status: SHIPPED | OUTPATIENT
Start: 2022-11-03 | End: 2022-11-03

## 2022-11-03 RX ORDER — SODIUM CHLORIDE, SODIUM LACTATE, POTASSIUM CHLORIDE, CALCIUM CHLORIDE 600; 310; 30; 20 MG/100ML; MG/100ML; MG/100ML; MG/100ML
100 INJECTION, SOLUTION INTRAVENOUS CONTINUOUS
Status: DISCONTINUED | OUTPATIENT
Start: 2022-11-03 | End: 2022-11-06 | Stop reason: HOSPADM

## 2022-11-03 RX ORDER — OXYTOCIN/RINGER'S LACTATE 20/1000 ML
PLASTIC BAG, INJECTION (ML) INTRAVENOUS
Status: DISCONTINUED | OUTPATIENT
Start: 2022-11-03 | End: 2022-11-03 | Stop reason: HOSPADM

## 2022-11-03 RX ORDER — ACETAMINOPHEN 325 MG/1
650 TABLET ORAL
Status: DISCONTINUED | OUTPATIENT
Start: 2022-11-03 | End: 2022-11-06 | Stop reason: HOSPADM

## 2022-11-03 RX ORDER — OXYTOCIN/RINGER'S LACTATE 30/500 ML
PLASTIC BAG, INJECTION (ML) INTRAVENOUS
Status: COMPLETED
Start: 2022-11-03 | End: 2022-11-03

## 2022-11-03 RX ORDER — FENTANYL CITRATE 50 UG/ML
INJECTION, SOLUTION INTRAMUSCULAR; INTRAVENOUS
Status: SHIPPED | OUTPATIENT
Start: 2022-11-03 | End: 2022-11-03

## 2022-11-03 RX ORDER — ONDANSETRON 4 MG/1
4 TABLET, ORALLY DISINTEGRATING ORAL
Status: DISCONTINUED | OUTPATIENT
Start: 2022-11-03 | End: 2022-11-06 | Stop reason: HOSPADM

## 2022-11-03 RX ORDER — ZOLPIDEM TARTRATE 5 MG/1
5 TABLET ORAL
Status: DISCONTINUED | OUTPATIENT
Start: 2022-11-03 | End: 2022-11-06 | Stop reason: HOSPADM

## 2022-11-03 RX ORDER — OXYTOCIN/RINGER'S LACTATE 30/500 ML
87.3 PLASTIC BAG, INJECTION (ML) INTRAVENOUS AS NEEDED
Status: DISCONTINUED | OUTPATIENT
Start: 2022-11-03 | End: 2022-11-06 | Stop reason: HOSPADM

## 2022-11-03 RX ORDER — SODIUM CHLORIDE, SODIUM LACTATE, POTASSIUM CHLORIDE, CALCIUM CHLORIDE 600; 310; 30; 20 MG/100ML; MG/100ML; MG/100ML; MG/100ML
INJECTION, SOLUTION INTRAVENOUS
Status: DISCONTINUED | OUTPATIENT
Start: 2022-11-03 | End: 2022-11-03 | Stop reason: HOSPADM

## 2022-11-03 RX ADMIN — Medication 30 UNITS/HR: at 02:27

## 2022-11-03 RX ADMIN — DOCUSATE SODIUM 100 MG: 100 CAPSULE, LIQUID FILLED ORAL at 22:02

## 2022-11-03 RX ADMIN — SODIUM CHLORIDE, POTASSIUM CHLORIDE, SODIUM LACTATE AND CALCIUM CHLORIDE 1000 ML: 600; 310; 30; 20 INJECTION, SOLUTION INTRAVENOUS at 00:55

## 2022-11-03 RX ADMIN — IBUPROFEN 800 MG: 800 TABLET, FILM COATED ORAL at 22:02

## 2022-11-03 RX ADMIN — SODIUM CHLORIDE, POTASSIUM CHLORIDE, SODIUM LACTATE AND CALCIUM CHLORIDE: 600; 310; 30; 20 INJECTION, SOLUTION INTRAVENOUS at 02:03

## 2022-11-03 RX ADMIN — SODIUM CHLORIDE, POTASSIUM CHLORIDE, SODIUM LACTATE AND CALCIUM CHLORIDE 100 ML/HR: 600; 310; 30; 20 INJECTION, SOLUTION INTRAVENOUS at 03:28

## 2022-11-03 RX ADMIN — KETOROLAC TROMETHAMINE 30 MG: 30 INJECTION, SOLUTION INTRAMUSCULAR at 03:02

## 2022-11-03 RX ADMIN — IBUPROFEN 800 MG: 800 TABLET, FILM COATED ORAL at 14:34

## 2022-11-03 RX ADMIN — SODIUM CHLORIDE 100 MCG: 9 INJECTION, SOLUTION INTRAVENOUS at 02:18

## 2022-11-03 RX ADMIN — BUPIVACAINE HYDROCHLORIDE 7.5 MG: 7.5 INJECTION, SOLUTION EPIDURAL; RETROBULBAR at 02:15

## 2022-11-03 RX ADMIN — MORPHINE SULFATE 0.15 MG: 0.5 INJECTION, SOLUTION EPIDURAL; INTRATHECAL; INTRAVENOUS at 02:15

## 2022-11-03 RX ADMIN — ZOLPIDEM TARTRATE 5 MG: 5 TABLET ORAL at 23:47

## 2022-11-03 RX ADMIN — SODIUM CHLORIDE 200 MCG: 9 INJECTION, SOLUTION INTRAVENOUS at 02:26

## 2022-11-03 RX ADMIN — CEFAZOLIN SODIUM 2 G: 1 INJECTION, POWDER, FOR SOLUTION INTRAMUSCULAR; INTRAVENOUS at 02:18

## 2022-11-03 RX ADMIN — PHENYLEPHRINE HYDROCHLORIDE 30 MCG/MIN: 10 INJECTION INTRAVENOUS at 02:28

## 2022-11-03 RX ADMIN — SODIUM CHLORIDE 100 MCG: 9 INJECTION, SOLUTION INTRAVENOUS at 02:39

## 2022-11-03 RX ADMIN — FENTANYL CITRATE 7.5 MCG: 0.05 INJECTION, SOLUTION INTRAMUSCULAR; INTRAVENOUS at 02:15

## 2022-11-03 RX ADMIN — Medication 87.3 MILLI-UNITS/MIN: at 04:21

## 2022-11-03 RX ADMIN — SODIUM CHLORIDE 200 MCG: 9 INJECTION, SOLUTION INTRAVENOUS at 02:34

## 2022-11-03 RX ADMIN — ONDANSETRON 4 MG: 2 INJECTION INTRAMUSCULAR; INTRAVENOUS at 01:36

## 2022-11-03 NOTE — PROGRESS NOTES
Operative Report    Patient: Carmita Cho MRN: [id number]  SSN: [id number]    YOB: 1995  Age: 27 y.o. Sex: female       Date of Surgery: 11/3/22    Preoperative Diagnosis: 37 weeks pregnant, active labor, previous Low vertical  with a T- extension( 28 week twin delivery- 2nd twin)    Postoperative Diagnosis:same    Surgery: Repeat Low transverse     Anesthesia: Spinal    Procedure:  Repeat Low transverse  section    Estimated Blood Loss:  889    Complications: None    Drains: Combs    Implants: none    Specimens: none     Surgical Findings: Normal Uterus tubes and ovaries, transverse defect in the uterus approx. 1 inch wide in area of previous  scar. Extended across the entire width of the uterus. LBI delivered in normal VTX fashion, 1 inch right side angled up extension had to be extended due to uterus clamping down over the infants neck area where the defect was. 3V Cord, Nl Placenta, Abxs Given, Cord Blood drawn, Peds and Resp present at delivery    Counts: Sponge and needle counts were correct times two.     Signed By:  Cindy Douglas MD     November 3, 2022

## 2022-11-03 NOTE — ANESTHESIA PROCEDURE NOTES
Spinal Block    Start time: 11/3/2022 2:12 AM  End time: 11/3/2022 2:15 AM  Performed by: Ellie Carbajal MD  Authorized by: Ellie Carbajal MD     Pre-procedure:   Indications: primary anesthetic  Preanesthetic Checklist: patient identified, risks and benefits discussed, anesthesia consent, site marked, patient being monitored, timeout performed and fire risk safety assessment completed and verbalized      Spinal Block:   Patient Position:  Seated  Prep Region:  Lumbar      Location:  L3-4  Technique:  Single shot  Local: bupivacaine (PF) (MARCAINE) 0.75 % (7.5 mg/mL) Intrathecal - Intrathecal   7.5 mg - 11/3/2022 2:15:00 AM  fentaNYL citrate (PF) IV sedation - Intrathecal   7.5 mcg - 11/3/2022 2:15:00 AM  morphine (PF) (DURAMORPH;ASTRAMORPH) 0.5 mg/mL injection - Intrathecal   0.15 mg - 11/3/2022 2:15:00 AM    Med Admin Time: 11/3/2022 2:15 AM    Needle:   Needle Type:  Pencil-tip  Needle Gauge:  27 G  Attempts:  1      Events: CSF confirmed        Assessment:  Insertion:  Uncomplicated  Patient tolerance:  Patient tolerated the procedure well with no immediate complications

## 2022-11-03 NOTE — ANESTHESIA POSTPROCEDURE EVALUATION
Procedure(s):   SECTION. spinal    Anesthesia Post Evaluation      Multimodal analgesia: multimodal analgesia used between 6 hours prior to anesthesia start to PACU discharge  Patient location during evaluation: floor (Labor and Delivery Unit)  Patient participation: complete - patient participated  Level of consciousness: awake  Pain score: 0  Pain management: adequate  Airway patency: patent  Anesthetic complications: no  Cardiovascular status: acceptable  Respiratory status: acceptable  Hydration status: acceptable  Comments: The patient was transferred from the obstetric operating room to the L&D patient room. The patient was hemodynamically stable. Handoff was given to the nursing staff. All pre-, intra-, and postoperative questions were answered.    Post anesthesia nausea and vomiting:  none  Final Post Anesthesia Temperature Assessment:  Normothermia (36.0-37.5 degrees C)      INITIAL Post-op Vital signs:   Vitals Value Taken Time   /72 22   Temp 36.2 °C (97.2 °F) 22   Pulse 72 22   Resp 18 22   SpO2 100 % 22

## 2022-11-03 NOTE — PROGRESS NOTES
Received patient from L and D via bed to room 306 accompanied by Anibal RN. Mother and [address] brochure , birth certificate info ,  hourly rounding info and white board information provided and reviewed with patient. Patient oriented to room along with unit procedures. Patient verbalized understanding of information given. Assessment completed- see flowsheet. Call bell in reach. 0238- zhu catheter removed by nursing student with instructor Candice Vásquez RN.    5824- Patient out of bed for first time to bathroom. Assist x 1 given. Steady gate noted. Patient denies being dizzy or lightheaded. Patient unable to void at this time. Assisted with ashley care. Patient back to bed independently. Call bell in reach.

## 2022-11-03 NOTE — PROGRESS NOTES
Progress Note    Patient: Nii Hernandez MRN: [id number]  SSN: [id number]    YOB: 1995  Age: 27 y.o. Sex: female      Admit Date: 11/3/2022    LOS: 0 days     Subjective:     No Complaints    Objective:     Vitals:    22 0742 22 0744 22 0747 22 0824   BP:  (!) 109/56  115/72   Pulse:  98  72   Resp:  16  18   Temp:  98 °F (36.7 °C)  97.2 °F (36.2 °C)   SpO2: 100%  100% 100%   Height:            Intake and Output:  Current Shift: No intake/output data recorded. Last three shifts:  1901 -  0700  In: 900 [I.V.:900]  Out: 700 [Urine:200]    Physical Exam:   Abd:  FF  INC: CDI    Lab/Data Review: All lab results for the last 24 hours reviewed.          Assessment:     Active Problems:    Abdominal pain affecting pregnancy (2022)      37 weeks gestation of pregnancy (11/3/2022)      Status post repeat low transverse  section (11/3/2022)      History of low vertical  section (11/3/2022)      Overview: WITH A T-EXTENSION      Plan:     Routine PP orders: PPD /POD # 1 RCS    DWP IF EVER PREGNANT AGAIN- NEEDS A REPEAT     Signed By: Stephani Marcelo MD     November 3, 2022

## 2022-11-03 NOTE — ANESTHESIA PREPROCEDURE EVALUATION
Relevant Problems   HEMATOLOGY   (+) Anemia during pregnancy in second trimester   (+) Beta thalassemia trait       Anesthetic History   No history of anesthetic complications            Review of Systems / Medical History  Patient summary reviewed, nursing notes reviewed and pertinent labs reviewed    Pulmonary  Within defined limits                 Neuro/Psych   Within defined limits           Cardiovascular  Within defined limits                     GI/Hepatic/Renal  Within defined limits              Endo/Other  Within defined limits           Other Findings   Comments: Procedure Information    Anesthesia Start Date/Time: 22 0203  Reason: Repeat STAT C Section  H/o prior LTCS with additional LV Incision    Now in precipitous spontaneous active labor      Medical History    Chlamydia  Kidney infection  Anemia  Anemia during pregnancy in third trimester  HPV (human papilloma virus) infection  History of group B Streptococcus (GBS) infection  IUGR (intrauterine growth restriction) affecting care of mother, third trimester, fetus 1  History of  delivery  Beta thalassemia trait  Low TSH level  Genetic carrier           Physical Exam    Airway  Mallampati: I  TM Distance: 4 - 6 cm  Neck ROM: normal range of motion   Mouth opening: Normal     Cardiovascular    Rhythm: regular  Rate: normal         Dental  No notable dental hx       Pulmonary  Breath sounds clear to auscultation               Abdominal  GI exam deferred       Other Findings            Anesthetic Plan    ASA: 2, emergent  Anesthesia type: spinal      Post-op pain plan if not by surgeon: intrathecal opiates      Anesthetic plan and risks discussed with: Patient and Spouse      Benefits and risks of spinal anesthesia discussed with patient/family. All questions answered.

## 2022-11-03 NOTE — H&P
History and Physical    Patient: Joselito Hopson MRN: [id number]  SSN: [id number]    YOB: 1995  Age: 27 y.o. Sex: female      Subjective:      Joselito Hopson is a 27 y.o. female at 40 + weeks presented to L and D in active labor, on admission pt. Found to be complete with a BBOW. On initial assessment by nurse pt denied having a previous , and stated all of her previous deliveries where vaginal. While entering information it was discovered that the pt had a previous  in - which was a  twin delivery of the second infant. Pt not sure of type of uterine incision. She had a transverse skin incision. Entered in the Pregnancy history it was noted as a low transverse. Given the twin delivery was at 28 weeks after further investigation in the chart- in one area only that was found the pt had a low vertical with a T extension and it was documented that she would have to have future C-sections. Otherwise the progress notes just note a low transverse incision. It was discussed with the pt and her SO that we would have to proceed with a repeat . Risks addressed, Staff and anesthesia notified. Past Medical History:   Diagnosis Date         Anemia     Anemia during pregnancy in third trimester     Beta thalassemia trait 2021    Chlamydia     Genetic carrier 7/15/2022    Possible carrier of spinal muscular atrophy. AA. Genetic counseling requested. History of group B Streptococcus (GBS) infection     History of  delivery     HPV (human papilloma virus) infection     IUGR (intrauterine growth restriction) affecting care of mother, third trimester, fetus 1 2018  EFW 6%., 2076 grams, 4 lb. 9 oz., Doppler umbilical artery wnl. Kidney infection     Low TSH level 2021    2/3/2021- TSH= 0.04.   T3, T4, and TSI labs ordered 2/10/2021     Past Surgical History:   Procedure Laterality Date    HX  SECTION      x 1 for twin B Family History   Problem Relation Age of Onset    Lupus Mother     Diabetes Father     Cancer Neg Hx      Social History     Tobacco Use    Smoking status: Never    Smokeless tobacco: Never   Substance Use Topics    Alcohol use: No     Alcohol/week: 0.0 standard drinks      Prior to Admission medications    Medication Sig Start Date End Date Taking? Authorizing Provider   prenatal KILN93/UQCW fum/folic (prenatal vitamin) 27 mg iron- 800 mcg tab tablet Take 1 Tablet by mouth daily. 22   Provider, Historical   ondansetron (ZOFRAN ODT) 4 mg disintegrating tablet Take 1 Tablet by mouth every eight (8) hours as needed for Nausea or Vomiting. Indications: excessive vomiting in pregnancy 22   Concha Hna MD   pantoprazole (PROTONIX) 20 mg tablet Take 1 Tablet by mouth in the morning. 22   Concha Han MD   prenatal vit no.130-iron-folic (Prenatal Vitamin) 27 mg iron- 800 mcg tab tablet Take 1 Tablet by mouth in the morning. 22   Concha Han MD   ferrous sulfate (IRON) 325 mg (65 mg iron) EC tablet Take 1 Tablet by mouth every other day. 7/15/22   Concha Han MD   folic acid (FOLVITE) 1 mg tablet Take 1 Tab by mouth daily. Patient not taking: Reported on 10/20/2022 2/3/21   JOHNATHAN Silverman        No Known Allergies    Review of Systems:  A comprehensive review of systems was negative except for that written in the History of Present Illness.     Objective:     Vitals:    22 0742 22 0744 22 0747 22 0824   BP:  (!) 109/56  115/72   Pulse:  98  72   Resp:  16  18   Temp:  98 °F (36.7 °C)  97.2 °F (36.2 °C)   SpO2: 100%  100% 100%   Height:            Physical Exam:  GENERAL: alert, cooperative, mild distress, appears stated age  Abd: Gravid, NST reactive, Uc's present  Cx: 70/C/BBOW/Vtx--> AROM Clear  ( Done prior to figuring out above previous  section and type)    Assessment:     Hospital Problems  Date Reviewed: 11/3/2022 Codes Class Noted POA    37 weeks gestation of pregnancy ICD-10-CM: Z3A.37  ICD-9-CM: V22.2  11/3/2022 Unknown        History of low vertical  section ICD-10-CM: Z98.891  ICD-9-CM: V45.89  11/3/2022 Unknown    Overview Signed 11/3/2022  8:40 AM by Glendy Barraza MD     WITH A T-EXTENSION             Abdominal pain affecting pregnancy ICD-10-CM: O26.899, R10.9  ICD-9-CM: 646.80, 789.00  2022 Unknown           Plan:     RCS    Signed By: Roc Oneal MD     November 3, 2022

## 2022-11-03 NOTE — PROGRESS NOTES
5431: Pt to LD 8 in wheelchair accompanied by ED Rn. Upon reaching the room pt states she has to use the bathroom and got up and rushed straight to the bathroom and closed the door behind her before any questions were ask of her. This opened bathroom door to ascertain if pt wanted to pass urine or stool, upon entering pt states that she needs some privacy while she was squatting over toilet, I proceeded to let her know that I needed to know what she was whether urine or stool to ensure she wasn't delivering, she refused to answer. After asking multiple time pt still refuse to respond, I proceed to touch her arm to let her know that I needed to check her cervix to ensure baby wasn't coming. She started shouting at me saying not to touch her then states she wanted to urinate and continue shouting at me to get out. She was advised to leave urine sample in cup which she stated she wasn't doing. Rn left pt in bathroom. 0046: Pt in bed EFM commence  0050: SVE done with pt's verbal consent 9.5/100/-2 with bulging membranes. Pt and s/o updated and plan of care discussed. Pt reluctant in answering questions. Enquired about previous c/section which pt denies and answered yes to having all previous vaginal deliveries. 1814: Dr. Aliyah Goodwin updated about pt SVE.     0058: Dr. Aliyah Goodwin at bedside    0111: AROM done moderate amount of clear fluid drained  0114: Pt started pushing, couched on pushing, pushing with contractions. 0123; Upon reviewing chart it was discovered that pt had previous c/section  2021 with baby B.   0125: BEN Cuadra CRNA informed about pt for epidural    0136: Zofran 2mg IVP given for nausea and vomiting. DR. Aliyah Goodwin at bedside and POC for c/section due to T-incision discussed with pt after reviewing chart. 0139: BEN Cuadra CRNA at bedside.       021: Pt off monitor to OR

## 2022-11-04 LAB
BASOPHILS # BLD: 0 K/UL (ref 0–0.1)
BASOPHILS NFR BLD: 0 % (ref 0–1)
DIFFERENTIAL METHOD BLD: ABNORMAL
EOSINOPHIL # BLD: 0.1 K/UL (ref 0–0.4)
EOSINOPHIL NFR BLD: 0 % (ref 0–7)
ERYTHROCYTE [DISTWIDTH] IN BLOOD BY AUTOMATED COUNT: 16.1 % (ref 11.5–14.5)
HCT VFR BLD AUTO: 27.6 % (ref 35–47)
HGB BLD-MCNC: 8.6 G/DL (ref 11.5–16)
IMM GRANULOCYTES # BLD AUTO: 0.1 K/UL (ref 0–0.04)
IMM GRANULOCYTES NFR BLD AUTO: 1 % (ref 0–0.5)
LYMPHOCYTES # BLD: 2.1 K/UL (ref 0.8–3.5)
LYMPHOCYTES NFR BLD: 16 % (ref 12–49)
MCH RBC QN AUTO: 24.1 PG (ref 26–34)
MCHC RBC AUTO-ENTMCNC: 31.2 G/DL (ref 30–36.5)
MCV RBC AUTO: 77.3 FL (ref 80–99)
MONOCYTES # BLD: 1.1 K/UL (ref 0–1)
MONOCYTES NFR BLD: 8 % (ref 5–13)
NEUTS SEG # BLD: 10.1 K/UL (ref 1.8–8)
NEUTS SEG NFR BLD: 75 % (ref 32–75)
NRBC # BLD: 0 K/UL (ref 0–0.01)
NRBC BLD-RTO: 0 PER 100 WBC
PLATELET # BLD AUTO: 174 K/UL (ref 150–400)
PMV BLD AUTO: 10.8 FL (ref 8.9–12.9)
RBC # BLD AUTO: 3.57 M/UL (ref 3.8–5.2)
WBC # BLD AUTO: 13.5 K/UL (ref 3.6–11)

## 2022-11-04 PROCEDURE — 36415 COLL VENOUS BLD VENIPUNCTURE: CPT

## 2022-11-04 PROCEDURE — 65410000002 HC RM PRIVATE OB

## 2022-11-04 PROCEDURE — 85025 COMPLETE CBC W/AUTO DIFF WBC: CPT

## 2022-11-04 PROCEDURE — 74011250637 HC RX REV CODE- 250/637: Performed by: OBSTETRICS & GYNECOLOGY

## 2022-11-04 RX ORDER — OXYCODONE HYDROCHLORIDE 5 MG/1
5 TABLET ORAL
Qty: 28 TABLET | Refills: 0 | Status: SHIPPED | OUTPATIENT
Start: 2022-11-04 | End: 2022-11-09

## 2022-11-04 RX ADMIN — SIMETHICONE 80 MG: 80 TABLET, CHEWABLE ORAL at 21:44

## 2022-11-04 RX ADMIN — ZOLPIDEM TARTRATE 5 MG: 5 TABLET ORAL at 23:04

## 2022-11-04 RX ADMIN — OXYCODONE 5 MG: 5 TABLET ORAL at 18:01

## 2022-11-04 RX ADMIN — DOCUSATE SODIUM 100 MG: 100 CAPSULE, LIQUID FILLED ORAL at 21:44

## 2022-11-04 RX ADMIN — IBUPROFEN 800 MG: 800 TABLET, FILM COATED ORAL at 05:35

## 2022-11-04 RX ADMIN — OXYCODONE 5 MG: 5 TABLET ORAL at 04:34

## 2022-11-04 RX ADMIN — IBUPROFEN 800 MG: 800 TABLET, FILM COATED ORAL at 17:13

## 2022-11-04 RX ADMIN — OXYCODONE 5 MG: 5 TABLET ORAL at 11:42

## 2022-11-04 RX ADMIN — SIMETHICONE 80 MG: 80 TABLET, CHEWABLE ORAL at 18:01

## 2022-11-04 RX ADMIN — DOCUSATE SODIUM 100 MG: 100 CAPSULE, LIQUID FILLED ORAL at 11:42

## 2022-11-04 NOTE — PROGRESS NOTES
Progress Note    Patient: Joby Roman MRN: [id number]  SSN: [id number]    YOB: 1995  Age: 27 y.o. Sex: female      Admit Date: 11/3/2022    LOS: 1 day     Subjective:     No Complaints    Objective:     Vitals:    22 1448 22 1905 22 2305 22 0820   BP: 119/70 122/66 120/66 133/79   Pulse: 91 88 84 87   Resp: 18 18 16 18   Temp: 98.5 °F (36.9 °C) 98.7 °F (37.1 °C) 97.8 °F (36.6 °C) 98 °F (36.7 °C)   SpO2: 98% 100% 98% 99%   Height:            Intake and Output:  Current Shift: No intake/output data recorded. Last three shifts:  1901 -  0700  In: 900 [I.V.:900]  Out: 2450 [Urine:1950]    Physical Exam:   Abd:  FF  INC: CDI    Lab/Data Review: All lab results for the last 24 hours reviewed.          Assessment:     Active Problems:    Abdominal pain affecting pregnancy (2022)      37 weeks gestation of pregnancy (11/3/2022)      History of low vertical  section (11/3/2022)      Overview: WITH A T-EXTENSION        Plan:     Routine PP/PO orders: POD/PPD # 1 RCS    Signed By: Shayan Walters MD     2022

## 2022-11-05 PROCEDURE — 74011250637 HC RX REV CODE- 250/637: Performed by: OBSTETRICS & GYNECOLOGY

## 2022-11-05 PROCEDURE — 65410000002 HC RM PRIVATE OB

## 2022-11-05 RX ADMIN — OXYCODONE 5 MG: 5 TABLET ORAL at 03:00

## 2022-11-05 RX ADMIN — SIMETHICONE 80 MG: 80 TABLET, CHEWABLE ORAL at 21:24

## 2022-11-05 RX ADMIN — DOCUSATE SODIUM 100 MG: 100 CAPSULE, LIQUID FILLED ORAL at 21:22

## 2022-11-05 RX ADMIN — Medication: at 15:55

## 2022-11-05 RX ADMIN — IBUPROFEN 800 MG: 800 TABLET, FILM COATED ORAL at 15:54

## 2022-11-05 RX ADMIN — DOCUSATE SODIUM 100 MG: 100 CAPSULE, LIQUID FILLED ORAL at 08:22

## 2022-11-05 RX ADMIN — ZOLPIDEM TARTRATE 5 MG: 5 TABLET ORAL at 23:32

## 2022-11-05 RX ADMIN — IBUPROFEN 800 MG: 800 TABLET, FILM COATED ORAL at 08:22

## 2022-11-05 RX ADMIN — OXYCODONE 5 MG: 5 TABLET ORAL at 08:22

## 2022-11-05 NOTE — PROGRESS NOTES
Progress Note    Patient: Julianne Martínez MRN: [id number]  SSN: [id number]    YOB: 1995  Age: 27 y.o. Sex: female      Admit Date: 11/3/2022    LOS: 2 days     Subjective:     Patient is without complaints, she is tolerating a regular diet, she reports minimal lochia    Objective:     Vitals:    22 1540 22 2018 22 2307 22 0825   BP: 114/72 129/78 125/78 136/81   Pulse: 86 95 84 93   Resp: 18 18 16 16   Temp: 97.8 °F (36.6 °C) 98.6 °F (37 °C) 98.6 °F (37 °C) 97.7 °F (36.5 °C)   SpO2: 100% 99% 100% 100%   Height:            Physical Exam:   Heart is with regular rate and rhythm  Lungs are clear  Fundus is below umbilicus  Wound is clean dry and intact  Extremities without clubbing cyanosis or edema    Lab/Data Review:  No new lab    Assessment:     Active Problems:    Abdominal pain affecting pregnancy (2022)      37 weeks gestation of pregnancy (11/3/2022)      History of low vertical  section (11/3/2022)      Overview: WITH A T-EXTENSION    Postoperative day #2 status post low transverse  section, stable.     Plan:     Routine postoperative care    Signed By: Judy Woodall MD     2022

## 2022-11-05 NOTE — PROGRESS NOTES
Problem: Patient Education: Go to Patient Education Activity  Goal: Patient/Family Education  Outcome: Resolved/Met     Problem:  Delivery: Day of Delivery  Goal: Off Pathway (Use only if patient is Off Pathway)  Outcome: Resolved/Met  Goal: Activity/Safety  Outcome: Resolved/Met  Goal: Consults, if ordered  Outcome: Resolved/Met  Goal: Diagnostic Test/Procedures  Outcome: Resolved/Met  Goal: Nutrition/Diet  Outcome: Resolved/Met  Goal: Discharge Planning  Outcome: Resolved/Met  Goal: Medications  Outcome: Resolved/Met  Goal: Respiratory  Outcome: Resolved/Met  Goal: Treatments/Interventions/Procedures  Outcome: Resolved/Met  Goal: Psychosocial  Outcome: Resolved/Met  Goal: *Vital signs within defined limits  Outcome: Resolved/Met  Goal: *Labs within defined limits  Outcome: Resolved/Met  Goal: *Hemodynamically stable  Outcome: Resolved/Met  Goal: *Optimal pain control at patient's stated goal  Outcome: Resolved/Met  Goal: *Participates in infant care  Outcome: Resolved/Met  Goal: *Demonstrates progressive activity  Outcome: Resolved/Met  Goal: *Tolerating diet  Outcome: Resolved/Met     Problem:  Delivery: Postpartum Day 1  Goal: Off Pathway (Use only if patient is Off Pathway)  Outcome: Resolved/Met  Goal: Activity/Safety  Outcome: Resolved/Met  Goal: Consults, if ordered  Outcome: Resolved/Met  Goal: Diagnostic Test/Procedures  Outcome: Resolved/Met  Goal: Nutrition/Diet  Outcome: Resolved/Met  Goal: Discharge Planning  Outcome: Resolved/Met  Goal: Medications  Outcome: Resolved/Met  Goal: Respiratory  Outcome: Resolved/Met  Goal: Treatments/Interventions/Procedures  Outcome: Resolved/Met  Goal: Psychosocial  Outcome: Resolved/Met  Goal: *Vital signs within defined limits  Outcome: Resolved/Met  Goal: *Labs within defined limits  Outcome: Resolved/Met  Goal: *Hemodynamically stable  Outcome: Resolved/Met  Goal: *Optimal pain control at patient's stated goal  Outcome: Resolved/Met  Goal: *Participates in infant care  Outcome: Resolved/Met  Goal: *Demonstrates progressive activity  Outcome: Resolved/Met  Goal: *Tolerating diet  Outcome: Resolved/Met  Goal: *Performs self perineal care  Outcome: Resolved/Met     Problem:  Delivery: Postpartum Day 2  Goal: Off Pathway (Use only if patient is Off Pathway)  Outcome: Resolved/Met  Goal: Activity/Safety  Outcome: Resolved/Met  Goal: Consults, if ordered  Outcome: Resolved/Met  Goal: Nutrition/Diet  Outcome: Resolved/Met  Goal: Discharge Planning  Outcome: Resolved/Met  Goal: Medications  Outcome: Resolved/Met  Goal: Treatments/Interventions/Procedures  Outcome: Resolved/Met  Goal: Psychosocial  Outcome: Resolved/Met  Goal: *Vital signs within defined limits  Outcome: Resolved/Met  Goal: *Labs within defined limits  Outcome: Resolved/Met  Goal: *Hemodynamically stable  Outcome: Resolved/Met  Goal: *Optimal pain control at patient's stated goal  Outcome: Resolved/Met  Goal: *Participates in infant care  Outcome: Resolved/Met  Goal: *Demonstrates progressive activity  Outcome: Resolved/Met  Goal: *Appropriate parent-infant bonding  Outcome: Resolved/Met  Goal: *Tolerating diet  Outcome: Resolved/Met

## 2022-11-05 NOTE — DISCHARGE INSTRUCTIONS
Depression After Childbirth: Care Instructions  It's common to lose sleep, feel irritable, and cry easily during the first few days after childbirth. Hormone changes and the demands of a new baby can cause these \"baby blues. \" If these mood changes last more than 2 weeks, you may have postpartum depression. This is a medical condition that requires treatment. If you have any of these signs, you may be depressed. See your doctor right away. You feel very sad or hopeless and lose interest in daily activities. You sleep too much or not enough. You feel tired or as if you have no energy. You eat too much or too little. You write or talk about death. Where to get help 24 hours a day, 7 days a week   If you or someone you know talks about suicide, self-harm, a mental health crisis, a substance use crisis, or any other kind of emotional distress, get help right away. You can:   Call the Suicide and Crisis Lifeline at 65. Call 5-658-710-TALK (). 8-908.378.9672    Text HOME to 512127 to access the Crisis Text Line. Consider saving these numbers in your phone. What you can do   Try to go to all of your counseling sessions. Take medicines as directed. Eat healthy foods. Get daily exercise, such as walks. Try to get some sunlight every day. Avoid using alcohol or other substances. Get as much rest as possible. Connect with friends, and join a support group for new parents. When should you call for help? Call 216 if: You feel you cannot stop from hurting yourself, your baby, or someone else. Call your doctor now or seek immediate medical care if:    You are having trouble caring for yourself or your baby. You hear voices. Contact your doctor if:    You have problems with your medicines. You do not get better as expected. Follow-up care is a key part of your treatment and safety.  Be sure to make and go to all appointments, and call your doctor if you are having problems. It's also a good idea to know your test results and keep a list of the medicines you take. Where can you learn more? Go to http://www.gray.com/  Enter [id number] in the search box to learn more about \"Depression After Childbirth: Care Instructions. \"  Current as of: 2022               Content Version: 13.4   [employer]. Care instructions adapted under license by [employer] (which disclaims liability or warranty for this information). If you have questions about a medical condition or this instruction, always ask your healthcare professional. [place] [address] any warranty or liability for your use of this information.  Section: What to Expect at [address] [place] [address]     A  section, or , is surgery to deliver your baby through a cut that the doctor makes in your lower belly and uterus. The cut is called an incision. You may have some pain in your lower belly and need pain medicine for 1 to 2 weeks. You can expect some vaginal bleeding for several weeks. You will probably need about 6 weeks to fully recover. It's important to take it easy while the incision heals. Avoid heavy lifting, strenuous activities, and exercises that strain the belly muscles while you recover. Ask a family member or friend for help with housework, cooking, and shopping. This care sheet gives you a general idea about how long it will take for you to recover. But each person recovers at a different pace. Follow the steps below to get better as quickly as possible. How can you care for yourself at home? Activity    Rest when you feel tired. Getting enough sleep will help you recover. Try to walk each day. Start by walking a little more than you did the day before. Bit by bit, increase the amount you walk. Walking boosts blood flow and helps prevent pneumonia, constipation, and blood clots.      Avoid strenuous activities, such as bicycle riding, jogging, weightlifting, and aerobic exercise, for 6 weeks or until your doctor says it is okay. Until your doctor says it is okay, do not lift anything heavier than your baby. Do not do sit-ups or other exercises that strain the belly muscles for 6 weeks or until your doctor says it is okay. Hold a pillow over your incision when you cough or take deep breaths. This will support your belly and decrease your pain. You may shower as usual. Pat the incision dry when you are done. You will have some vaginal bleeding. Wear sanitary pads. Do not douche or use tampons until your doctor says it is okay. Ask your doctor when you can drive again. You will probably need to take at least 6 weeks off work. It depends on the type of work you do and how you feel. Ask your doctor when it is okay for you to have sex. Diet    You can eat your normal diet. If your stomach is upset, try bland, low-fat foods like plain rice, broiled chicken, toast, and yogurt. Drink plenty of fluids (unless your doctor tells you not to). You may notice that your bowel movements are not regular right after your surgery. This is common. Try to avoid constipation and straining with bowel movements. You may want to take a fiber supplement every day. If you have not had a bowel movement after a couple of days, ask your doctor about taking a mild laxative. If you are breastfeeding, limit alcohol. Alcohol can cause a lack of energy and other health problems for the baby when a breastfeeding woman drinks heavily. It can also get in the way of a mom's ability to feed her baby or to care for the child in other ways. There isn't a lot of research about exactly how much alcohol can harm a baby. Having no alcohol is the safest choice for your baby. If you choose to have a drink now and then, have only one drink, and limit the number of occasions that you have a drink.  Wait to breastfeed at least 2 hours after you have a drink to reduce the amount of alcohol the baby may get in the milk. Medicines    Your doctor will tell you if and when you can restart your medicines. You will also get instructions about taking any new medicines. If you stopped taking aspirin or some other blood thinner, your doctor will tell you when to start taking it again. Take pain medicines exactly as directed. If the doctor gave you a prescription medicine for pain, take it as prescribed. If you are not taking a prescription pain medicine, ask your doctor if you can take an over-the-counter medicine. If you think your pain medicine is making you sick to your stomach: Take your medicine after meals (unless your doctor has told you not to). Ask your doctor for a different pain medicine. If your doctor prescribed antibiotics, take them as directed. Do not stop taking them just because you feel better. You need to take the full course of antibiotics. Incision care    If you have strips of tape on the incision, leave the tape on for a week or until it falls off. Wash the area daily with warm, soapy water, and pat it dry. Don't use hydrogen peroxide or alcohol, which can slow healing. You may cover the area with a gauze bandage if it weeps or rubs against clothing. Change the bandage every day. Keep the area clean and dry. Other instructions    If you breastfeed your baby, you may be more comfortable while you are healing if you don't rest your baby on your belly. Try tucking your baby under your arm, with your baby's body along the side you will be feeding on. Support your baby's upper body with your arm. With that hand you can control your baby's head to bring your baby's mouth to your breast. This is sometimes called the football hold. Follow-up care is a key part of your treatment and safety.  Be sure to make and go to all appointments, and call your doctor if you are having problems. It's also a good idea to know your test results and keep a list of the medicines you take. When should you call for help? Share this information with your partner, family, or a friend. They can help you watch for warning signs. Call 911  anytime you think you may need emergency care. For example, call if:    You have thoughts of harming yourself, your baby, or another person. You passed out (lost consciousness). You have chest pain, are short of breath, or cough up blood. You have a seizure. Call your doctor now or seek immediate medical care if:    You have loose stitches, or your incision comes open. You have signs of hemorrhage (too much bleeding), such as:  Heavy vaginal bleeding. This means that you are soaking through one or more pads in an hour. Or you pass blood clots bigger than an egg. Feeling dizzy or lightheaded, or you feel like you may faint. Feeling so tired or weak that you cannot do your usual activities. A fast or irregular heartbeat. New or worse belly pain. You have symptoms of infection, such as: Increased pain, swelling, warmth, or redness. Red streaks leading from the incision. Pus draining from the incision. A fever. Vaginal discharge that smells bad. New or worse belly pain. You have symptoms of a blood clot in your leg (called a deep vein thrombosis), such as:  Pain in your calf, back of the knee, thigh, or groin. Redness and swelling in your leg or groin. You have signs of preeclampsia, such as:  Sudden swelling of your face, hands, or feet. New vision problems (such as dimness, blurring, or seeing spots). A severe headache. Watch closely for changes in your health, and be sure to contact your doctor if:    Your vaginal bleeding isn't decreasing. You feel sad, anxious, or hopeless for more than a few days. You are having problems with your breasts or breastfeeding. Where can you learn more?   Go to http://www.gray.com/  Enter M806 in the search box to learn more about \" Section: What to Expect at Home. \"  Current as of: 2022               Content Version: 13.4   [employer]. Care instructions adapted under license by [employer] (which disclaims liability or warranty for this information). If you have questions about a medical condition or this instruction, always ask your healthcare professional. [address] any warranty or liability for your use of this information. Postpartum Breast Care When You Don't Plan to Breastfeed: Care Instructions  Overview  Your breasts will start to make milk in the first couple of days after you give birth. This happens even if you don't breastfeed. You may have some milk leak from your breasts, and your breasts may feel sore and swollen. This is called engorgement. It usually gets better after several days. Over time, your body will stop making milk if you don't breastfeed or pump. This can take up to several weeks. You can take steps at home to decrease your discomfort and help your breasts stop making milk. Follow-up care is a key part of your treatment and safety. Be sure to make and go to all appointments, and call your doctor if you are having problems. It's also a good idea to know your test results and keep a list of the medicines you take. How can you care for yourself at home? Don't pump or remove milk from your breasts by hand. Wear a bra that fits well and provides good support. You may find that it helps to wear a bra even while you sleep. Apply a cold pack to your breasts for 15 minutes at a time every hour as needed. You can use a frozen wet towel, a cold pack, or a bag of frozen vegetables. To prevent damage to your skin, put a thin cloth between the cold pack and your skin. Take ibuprofen (such as Advil or Motrin) to reduce pain and swelling. Be safe with medicines. Read and follow all instructions on the label. When should you call for help? Call your doctor now or seek immediate medical care if:    You have symptoms of a breast infection, such as: Increased pain, swelling, redness, or warmth around a breast.  Red streaks leading from your breast.  Pus draining from your breast.  A fever. Watch closely for changes in your health, and be sure to contact your doctor if:    You do not get better as expected. Where can you learn more? Go to http://www.gray.com/  Enter B385 in the search box to learn more about \"Postpartum Breast Care When You Don't Plan to Breastfeed: Care Instructions. \"  Current as of: February 23, 2022               Content Version: 13.4  © 2006-2022 [employer]. Care instructions adapted under license by [employer] (which disclaims liability or warranty for this information). If you have questions about a medical condition or this instruction, always ask your healthcare professional. [place] [address] any warranty or liability for your use of this information. Constipation: Care Instructions  Overview     Constipation means that you have a hard time passing stools (bowel movements). People pass stools from 3 times a day to once every 3 days. What is normal for you may be different. Constipation may occur with pain in the rectum and cramping. The pain may get worse when you try to pass stools. Sometimes there are small amounts of bright red blood on toilet paper or the surface of stools. This is because of enlarged veins near the rectum (hemorrhoids). A few changes in your diet and lifestyle may help you avoid ongoing constipation. Your doctor may also prescribe medicine to help loosen your stool. Some medicines can cause constipation. These include pain medicines and antidepressants. Tell your doctor about all the medicines you take.  Your doctor may want to make a medicine change to ease your symptoms. Follow-up care is a key part of your treatment and safety. Be sure to make and go to all appointments, and call your doctor if you are having problems. It's also a good idea to know your test results and keep a list of the medicines you take. How can you care for yourself at home? Drink plenty of fluids. If you have kidney, heart, or liver disease and have to limit fluids, talk with your doctor before you increase the amount of fluids you drink. Include high-fiber foods in your diet each day. These include fruits, vegetables, beans, and whole grains. Get at least 30 minutes of exercise on most days of the week. Walking is a good choice. You also may want to do other activities, such as running, swimming, cycling, or playing tennis or team sports. Take a fiber supplement, such as Citrucel or Metamucil, every day. Read and follow all instructions on the label. Schedule time each day for a bowel movement. A daily routine may help. Take your time having a bowel movement, but don't sit for more than 10 minutes at a time. And don't strain too much. Support your feet with a small step stool when you sit on the toilet. This helps flex your hips and places your pelvis in a squatting position. Your doctor may recommend an over-the-counter laxative to relieve your constipation. Examples are Milk of Magnesia and MiraLax. Read and follow all instructions on the label. Do not use laxatives on a long-term basis. When should you call for help? Call your doctor now or seek immediate medical care if:    You have new or worse belly pain. You have new or worse nausea or vomiting. You have blood in your stools. Watch closely for changes in your health, and be sure to contact your doctor if:    Your constipation is getting worse. You do not get better as expected. Where can you learn more?   Go to http://www.gray.com/  Enter P343 in the search box to learn more about \"Constipation: Care Instructions. \"  Current as of: June 6, 2022               Content Version: 13.4  © 2006-2022 [employer]. Care instructions adapted under license by [employer] (which disclaims liability or warranty for this information). If you have questions about a medical condition or this instruction, always ask your healthcare professional. [place] [address] any warranty or liability for your use of this information.

## 2022-11-05 NOTE — PROGRESS NOTES
No retinal holes or tears seen on exam. Recommended observation. We reviewed the signs and symptoms of retinal tear/retinal detachment and the importance of prompt evaluation should there be increasing floaters, new flashing lights, or decreasing peripheral vision in either eye at any time. Patient understands condition, prognosis and need for follow up care. Problem: Patient Education: Go to Patient Education Activity  Goal: Patient/Family Education  2022 by Chelsea Arevalo RN  Outcome: Progressing Towards Goal  2022 by Chelsea Arevalo RN  Outcome: Progressing Towards Goal     Problem:  Delivery: Day of Delivery  Goal: Off Pathway (Use only if patient is Off Pathway)  Outcome: Progressing Towards Goal  Goal: Activity/Safety  Outcome: Progressing Towards Goal  Goal: Consults, if ordered  Outcome: Progressing Towards Goal  Goal: Diagnostic Test/Procedures  Outcome: Progressing Towards Goal  Goal: Nutrition/Diet  Outcome: Progressing Towards Goal  Goal: Discharge Planning  Outcome: Progressing Towards Goal  Goal: Medications  Outcome: Progressing Towards Goal  Goal: Respiratory  Outcome: Progressing Towards Goal  Goal: Treatments/Interventions/Procedures  Outcome: Progressing Towards Goal  Goal: Psychosocial  Outcome: Progressing Towards Goal  Goal: *Vital signs within defined limits  Outcome: Progressing Towards Goal  Goal: *Labs within defined limits  Outcome: Progressing Towards Goal  Goal: *Hemodynamically stable  Outcome: Progressing Towards Goal  Goal: *Optimal pain control at patient's stated goal  Outcome: Progressing Towards Goal  Goal: *Participates in infant care  Outcome: Progressing Towards Goal  Goal: *Demonstrates progressive activity  Outcome: Progressing Towards Goal  Goal: *Tolerating diet  Outcome: Progressing Towards Goal     Problem:  Delivery: Postpartum Day 2  Goal: Off Pathway (Use only if patient is Off Pathway)  Outcome: Progressing Towards Goal  Goal: Activity/Safety  Outcome: Progressing Towards Goal  Goal: Consults, if ordered  Outcome: Progressing Towards Goal  Goal: Nutrition/Diet  Outcome: Progressing Towards Goal  Goal: Discharge Planning  Outcome: Progressing Towards Goal  Goal: Medications  Outcome: Progressing Towards Goal  Goal: Treatments/Interventions/Procedures  Outcome: Progressing Towards Goal  Goal: Psychosocial  Outcome: Progressing Towards Goal  Goal: *Vital signs within defined limits  Outcome: Progressing Towards Goal  Goal: *Labs within defined limits  Outcome: Progressing Towards Goal  Goal: *Hemodynamically stable  Outcome: Progressing Towards Goal  Goal: *Optimal pain control at patient's stated goal  Outcome: Progressing Towards Goal  Goal: *Participates in infant care  Outcome: Progressing Towards Goal  Goal: *Demonstrates progressive activity  Outcome: Progressing Towards Goal  Goal: *Appropriate parent-infant bonding  Outcome: Progressing Towards Goal  Goal: *Tolerating diet  Outcome: Progressing Towards Goal

## 2022-11-06 VITALS
HEIGHT: 62 IN | HEART RATE: 82 BPM | TEMPERATURE: 98.4 F | RESPIRATION RATE: 16 BRPM | DIASTOLIC BLOOD PRESSURE: 80 MMHG | OXYGEN SATURATION: 98 % | SYSTOLIC BLOOD PRESSURE: 120 MMHG | BODY MASS INDEX: 25.24 KG/M2

## 2022-11-06 PROCEDURE — 74011250637 HC RX REV CODE- 250/637: Performed by: OBSTETRICS & GYNECOLOGY

## 2022-11-06 RX ORDER — IBUPROFEN 800 MG/1
800 TABLET ORAL EVERY 8 HOURS
Qty: 30 TABLET | Refills: 0 | Status: SHIPPED | OUTPATIENT
Start: 2022-11-06

## 2022-11-06 RX ADMIN — IBUPROFEN 800 MG: 800 TABLET, FILM COATED ORAL at 08:20

## 2022-11-06 RX ADMIN — IBUPROFEN 800 MG: 800 TABLET, FILM COATED ORAL at 01:30

## 2022-11-06 RX ADMIN — ACETAMINOPHEN 650 MG: 325 TABLET, FILM COATED ORAL at 11:49

## 2022-11-06 RX ADMIN — DOCUSATE SODIUM 100 MG: 100 CAPSULE, LIQUID FILLED ORAL at 08:20

## 2022-11-06 RX ADMIN — SIMETHICONE 80 MG: 80 TABLET, CHEWABLE ORAL at 08:23

## 2022-11-06 NOTE — PROGRESS NOTES
Patient given discharge instructions. Patient aware when to contact MD. Patient aware of appointment. Patient aware medications sent to pharmacy. Patient denies any questions or concerns at this time. Discharge plan of care/case management plan validated with provider discharge order. 1500- Pt discharged to main entrance via wheelchair with baby in car seat. Pt belongs on her person.

## 2022-11-06 NOTE — PROGRESS NOTES
Problem:  Delivery: Discharge Outcomes  Goal: *Follow-up appointments as indicated  Outcome: Resolved/Met  Goal: *Describes available resources and support systems  Outcome: Resolved/Met  Goal: *No signs and symptoms of infection  Outcome: Resolved/Met  Goal: *Birth certificate information completed  Outcome: Resolved/Met  Goal: *Received and verbalizes understanding of discharge plan and instructions  Outcome: Resolved/Met  Goal: *Vital signs within defined limits  Outcome: Resolved/Met  Goal: *Labs within defined limits  Outcome: Resolved/Met  Goal: *Hemodynamically stable  Outcome: Resolved/Met  Goal: *Optimal pain control at patient's stated goal  Outcome: Resolved/Met  Goal: *Participates in infant care  Outcome: Resolved/Met  Goal: *Demonstrates progressive activity  Outcome: Resolved/Met  Goal: *Appropriate parent-infant bonding  Outcome: Resolved/Met  Goal: *Tolerating diet  Outcome: Resolved/Met  Goal: *Verbalizes name, dosage, time, side effects, and number of days to continue medications  Outcome: Resolved/Met  Goal: *Influenza vaccine administered (October-March)  Outcome: Resolved/Met     Problem: Falls - Risk of  Goal: *Absence of Falls  Description: Document Jordan Fall Risk and appropriate interventions in the flowsheet.   Outcome: Resolved/Met  Note: Fall Risk Interventions:            Medication Interventions: Teach patient to arise slowly                   Problem: Patient Education: Go to Patient Education Activity  Goal: Patient/Family Education  Outcome: Resolved/Met

## 2022-11-06 NOTE — DISCHARGE SUMMARY
Discharge Summary     Patient: Mariama Em MRN: [id number]  SSN: [id number]    YOB: 1995  Age: 27 y.o. Sex: female       Admit Date: 11/3/2022    Discharge Date: 2022      Admission Diagnoses: Abdominal pain affecting pregnancy [O26.899, R10.9]    Discharge Diagnoses: Intrauterine pregnancy at term delivered by low transverse  section  Problem List as of 2022 Date Reviewed: 11/3/2022            Codes Class Noted - Resolved    RESOLVED: Abnormal fetal heart beat noted before labor in liveborn infant ICD-10-CM: P03.810  ICD-9-CM: 763.81  2018 - 2021        RESOLVED: IUGR (intrauterine growth restriction) affecting care of mother, third trimester, fetus 1 ICD-10-CM: K34.0231  ICD-9-CM: 656.53  2018 - 2021    Overview Signed 2018 11:57 AM by Inna Hodge MD     18  EFW 6%., 2076 grams, 4 lb. 9 oz., Doppler umbilical artery wnl.              RESOLVED: 35 weeks gestation of pregnancy ICD-10-CM: Z3A.35  ICD-9-CM: V22.2  2018 - 2021        37 weeks gestation of pregnancy ICD-10-CM: Z3A.37  ICD-9-CM: V22.2  11/3/2022 - Present        Status post repeat low transverse  section ICD-10-CM: Z98.891  ICD-9-CM: V45.89  11/3/2022 - Present        History of low vertical  section ICD-10-CM: Z98.891  ICD-9-CM: V45.89  11/3/2022 - Present    Overview Signed 11/3/2022  8:40 AM by Chelsea Chamberlain MD     WITH A T-EXTENSION             Limited prenatal care in third trimester ICD-10-CM: O09.33  ICD-9-CM: V23.7  10/21/2022 - Present        Left against medical advice ICD-10-CM: Z53.29  ICD-9-CM: V64.2  10/21/2022 - Present        Back pain affecting pregnancy ICD-10-CM: O99.891, M54.9  ICD-9-CM: 646.80, 724.5  2022 - Present        Abdominal pain affecting pregnancy ICD-10-CM: O26.899, R10.9  ICD-9-CM: 646.80, 789.00  2022 - Present        Single umbilical artery affecting management of mother in pinedo pregnancy, antepartum ICD-10-CM: O09.899  ICD-9-CM: V23.89  2022 - Present        Pregnancy ICD-10-CM: Z34.90  ICD-9-CM: V22.2  2022 - Present        Genetic carrier ICD-10-CM: Z14.8  ICD-9-CM: V83.89  7/15/2022 - Present    Overview Signed 7/15/2022  9:12 AM by Leann Motta MD     Possible carrier of spinal muscular atrophy. AA. Genetic counseling requested. High-risk pregnancy in third trimester ICD-10-CM: O09.93  ICD-9-CM: V23.9  2022 - Present        Desires  (vaginal birth after ) trial ICD-10-CM: O34.219  ICD-9-CM: 654.20  2022 - Present        Beta thalassemia trait ICD-10-CM: D56.3  ICD-9-CM: 282.46  2021 - Present    Overview Addendum 2021  7:46 AM by Liz Sanchez RN     [facility] referral to genetic counseling? Darryl Tavera Referral sent. Appt 3/4/21.  Pt aware             Maternal varicella, non-immune ICD-10-CM: O09.899, Z28.39  ICD-9-CM: V49.89  2021 - Present    Overview Signed 2021 10:20 AM by JOHNATHAN Hardy     Will need vaccination postpartum             History of  delivery, currently pregnant in third trimester ICD-10-CM: O09.893  ICD-9-CM: V23.89  2021 - Present    Overview Addendum 2/3/2021  1:17 PM by JOHNATHAN Hardy     Induced at 35w6d in 2018-   Hx of IUGR             Anemia during pregnancy in second trimester ICD-10-CM: O99.012  ICD-9-CM: 648.23  9/10/2018 - Present        RESOLVED: History of  section, low transverse ICD-10-CM: Z98.891  ICD-9-CM: V45.89  2022 - 11/3/2022    Overview Signed 11/3/2022  3:47 AM by Felicia Bar MD     WITH A T-EXTENSION             RESOLVED:  labor in second trimester without delivery ICD-10-CM: O60.02  ICD-9-CM: 644.03  4/3/2021 - 2022    Overview Signed 4/3/2021  6:57 PM by Deanna Colindres MD     Steroids received             RESOLVED: Anemia affecting pregnancy in second trimester ICD-10-CM: O99.012  ICD-9-CM: 648.23, 285.9  2021 - 2022    Overview Signed 2021 10:26 AM by JOHNATHAN Sosa     2/3/2021- Hgb= 10.7. Added Iron supplementation. Repeat Hgb at next OB visit             RESOLVED: Low TSH level ICD-10-CM: R79.89  ICD-9-CM: 794.5  2021 - 2022    Overview Signed 2021 10:29 AM by JOHNATHAN Sosa     2/3/2021- TSH= 0.04. T3, T4, and TSI labs ordered 2/10/2021             RESOLVED: Dichorionic diamniotic twin pregnancy ICD-10-CM: O30.049  ICD-9-CM: 651.00, V91.03  2021 - 2022    Overview Addendum 2021  7:45 AM by Evangelista Carrillo RN     LMP= 10/14/2020, RK= 2021 based on early Ultrasound at [facility]. on 2020  Needs early 1-hr GTT- supplies given 2/3/2021  Referral to [facility]FM for morphology and consult MFM referral sent. Appt 3/19/21 at 1 pm, 1:45 and 2pm. Pt aware             RESOLVED: IUP (intrauterine pregnancy), incidental ICD-10-CM: Z33.1  ICD-9-CM: V22.2  9/10/2018 - 2021        RESOLVED: Pregnancy with 35 completed weeks gestation ICD-10-CM: Z3A.35  ICD-9-CM: V22.2  9/10/2018 - 2021        RESOLVED: History of prior pregnancy with IUGR  ICD-10-CM: Z87.59  ICD-9-CM: V13.29  2018 - 2022    Overview Addendum 3/4/2021  2:05 PM by Magdalene Cadet     IUGR 2018    18  EFW 6%., 2076 grams, 4 lb. 9 oz., Doppler umbilical artery wnl. RESOLVED: Late prenatal care ICD-10-CM: O09.30  ICD-9-CM: V23.7  2018 - 2021    Overview Signed 2018  6:02 PM by Ryan Olivia NP     Initiated prenatal care with [facility] at 18 weeks gestation              RESOLVED: Chlamydia infection affecting pregnancy in first trimester ICD-10-CM: O98.811, A74.9  ICD-9-CM: 647.63, 079.98  2018 - 2022    Overview Addendum 2021 12:36 PM by JOHNATHAN Sosa      2020 at [place] [address] 2/3/21 was negative. Boyfriend tested negative; maybe false positive?                  Discharge Condition: Good    Hospital Course: Patient presented to the hospital in active labor with history of low transverse  section that required a T extension of the uterus. She underwent low transverse  section without complication. She did well postoperatively was able to tolerate a regular diet on the first postoperative day. She remained afebrile throughout her hospital course. She subsequently discharged home on the third postoperative day in good condition. Consults: None    Significant Diagnostic Studies: labs: None    Disposition: Home self-care with office follow-up    Discharge Medications:   Current Discharge Medication List        START taking these medications    Details   ibuprofen (MOTRIN) 800 mg tablet Take 1 Tablet by mouth every eight (8) hours. Qty: 30 Tablet, Refills: 0      oxyCODONE IR (ROXICODONE) 5 mg immediate release tablet Take 1 Tablet by mouth every four (4) hours as needed for Pain for up to 5 days. Max Daily Amount: 30 mg.  Qty: 28 Tablet, Refills: 0    Associated Diagnoses: Status post repeat low transverse  section           CONTINUE these medications which have NOT CHANGED    Details   prenatal vit no.130-iron-folic (Prenatal Vitamin) 27 mg iron- 800 mcg tab tablet Take 1 Tablet by mouth in the morning. Qty: 90 Tablet, Refills: 1    Associated Diagnoses: Prenatal care in second trimester      ferrous sulfate (IRON) 325 mg (65 mg iron) EC tablet Take 1 Tablet by mouth every other day.   Qty: 60 Tablet, Refills: 3    Associated Diagnoses: Anemia affecting pregnancy in second trimester           STOP taking these medications       prenatal UWYA30/TCXD fum/folic (prenatal vitamin) 27 mg iron- 800 mcg tab tablet Comments:   Reason for Stopping:         ondansetron (ZOFRAN ODT) 4 mg disintegrating tablet Comments:   Reason for Stopping:         pantoprazole (PROTONIX) 20 mg tablet Comments:   Reason for Stopping:         folic acid (FOLVITE) 1 mg tablet Comments:   Reason for Stopping:               Activity: Increase activity as tolerated  Diet: Regular  Wound Care: Keep wound clean and dry    Follow-up Appointments   Procedures    FOLLOW UP VISIT Appointment in: 6 Weeks     Standing Status:   Standing     Number of Occurrences:   1     Order Specific Question:   Appointment in     Answer:   6 Weeks       Signed By: Ruddy Patton MD     November 6, 2022

## 2022-11-06 NOTE — PROGRESS NOTES
Patient c/o breasts feeling heavy and sore. Patient educated about applying ice packs to help. Ice pack provided and patient instructed to use for 15-20 minutes on and 15-20 minutes off. Patient voiced understanding. Pain medication offered and patient declined. Patient also educated that she may use bags of frozen peas applied to breasts for 15-20 minutes on and off to help with symptoms of engorgement. Patient denies having any questions.

## 2022-11-06 NOTE — PROGRESS NOTES
Progress Note    Patient: Farhana Reaves MRN: [id number]  SSN: [id number]    YOB: 1995  Age: 27 y.o. Sex: female      Admit Date: 11/3/2022    LOS: 3 days     Subjective:     Patient is without complaints, she reports minimal lochia, she is tolerating a regular diet    Objective:     Vitals:    22 0825 22 1556 22 2333 22 0818   BP: 136/81 131/75 120/74 120/80   Pulse: 93 97 86 82   Resp: 16 18 18 16   Temp: 97.7 °F (36.5 °C) 98.2 °F (36.8 °C) 98.6 °F (37 °C) 98.4 °F (36.9 °C)   SpO2: 100% 100% 100% 98%   Height:            Physical Exam:   Fundus is below umbilicus  Wounds clean dry and intact  Extremities without clubbing cyanosis or edema    Lab/Data Review:  New lab    Assessment:     Active Problems:    Abdominal pain affecting pregnancy (2022)      37 weeks gestation of pregnancy (11/3/2022)      History of low vertical  section (11/3/2022)      Overview: WITH A T-EXTENSION    Status post repeat low transverse  section, stable.     Plan:     Discharged home today    Signed By: Rere Colbert MD     2022

## 2022-11-08 NOTE — OP NOTES
[facility]  OPERATIVE REPORT    Name:  Hilda Wilkins  MR#:  [id number]  :  1995  ACCOUNT #:  [de-identified]  DATE OF SERVICE:  2022    PREOPERATIVE DIAGNOSES:  1.  37 weeks' pregnant. 2.  Active labor. 3.  Previous low-vertical  with the T extension at 28 weeks for delivery of the second twin. POSTOPERATIVE DIAGNOSES:  1.  37 weeks' pregnant. 2.  Active labor. 3.  Previous low-vertical  with the T extension at 28 weeks for delivery of the second twin. PROCEDURE PERFORMED:  Repeat . SURGEON:  Shireen Painter MD    ASSISTANT:  Main Maier. ANESTHESIA:  Spinal.    COMPLICATIONS:  None. SPECIMENS REMOVED:  None. IMPLANTS:  None. ESTIMATED BLOOD LOSS:  500 mL. DRAINS:  Combs catheter. SURGICAL FINDINGS:  Normal uterus, tubes and ovaries. There was a transverse defect in the uterus approximately 1 inch wide in the area of previous  scar. This extended across the entire width of the uterus. Liveborn infant was delivered normal vertex fashion. About approximately an 1-inch right side angled up extension had to be done due to the uterus clamping down over the infant's neck in the area where the defect was. Three-vessel cord. Normal-appearing placenta. Antibiotics given. Cord blood drawn. Peds and Respiratory present at the time of delivery. PROCEDURE:  The patient was taken to operating room. After an informed consent had been reviewed, she was placed on the operating room table in the supine position after being administered spinal anesthesia. She was prepped and draped in a normal sterile fashion. A Pfannenstiel skin incision was made through her previous incision, taken down to the underlying layer of fascia. The fascia was nicked in the midline. It was extended laterally using the curved Campbell scissors. Superior margin of the fascia was grasped with Kocher clamps x2.   The rectus muscles were then  both sharply and bluntly. Attention was turned to the inferior margin where it was done in A similar fashion. Rectus muscles were  in the midline. Peritoneum was identified, tented up and entered sharply and extended superiorly and inferiorly under direct vision. Bladder blade was placed. The above findings were noted. Bladder flap was created and then a transverse lower uterine segment incision was made with a scalpel. This was taken down to the amniotic sac. It was nicked in the midline. Clear fluid was noted. Uterine incision was then extended laterally using blunt dissection. The infant's head was brought into the uterine incision site while applying fundal pressure. As noted above, there was a defect from her previous T extension. A clamp down over the infant's neck to the patient's right side approximately about a 1 inch angled laterally extension was performed using the scalpel, which freed up this band and the infant was then delivered. The infant was suctioned. The cord was clamped x2 and cut. The infant was handed off to the awaiting staff. Cord blood was drawn. Placenta was delivered. Uterus was cleared off any remaining blood clot and tissue. Bladder blade was replaced. Uterine incision was grasped with Allis clamps and reapproximated using a 0 Vicryl in a running lock fashion. Several 0 Vicryl imbricating stitches were placed to ensure good hemostasis. After good hemostasis was achieved, all areas were inspected, noted be hemostatic. The bladder flap was reapproximated using a 2-0 Vicryl in a running fashion. After this was done, all instruments, laps, and any remaining blood were removed from the pelvis. Anterior peritoneum was reapproximated using a 2-0 Vicryl. Fascia was then reapproximated using a 0 Vicryl in a running fashion. Subcuticular fat was inspected.   Hemostasis was achieved with the Bovie was reapproximated in an interrupted fashion and the skin was closed using a 4-0 Monocryl in a subcuticular fashion. Sponge, lap and needle counts were correct x2. The patient tolerated the procedure without complications and was taken to the recovery room in stable condition.         MD GAIL [email or web address]  D:  11/07/2022 14:28  T:  11/07/2022 21:03  JOB #:  [id number]

## 2023-01-09 NOTE — TELEPHONE ENCOUNTER
No additional comment Niacinamide Counseling: I recommended taking niacin or niacinamide, also know as vitamin B3, twice daily. Recent evidence suggests that taking vitamin B3 (500 mg twice daily) can reduce the risk of actinic keratoses and non-melanoma skin cancers. Side effects of vitamin B3 include flushing and headache.

## (undated) DEVICE — DRAPE C-SECTION W/WINDOW --

## (undated) DEVICE — SOL IRR SOD CL 0.9% 1000ML BTL --

## (undated) DEVICE — SUTURE VCRL SZ 0 L36IN ABSRB UD L40MM CT 1/2 CIR TAPERPOINT J958H

## (undated) DEVICE — C-SECTION: Brand: MEDLINE INDUSTRIES, INC.

## (undated) DEVICE — STERILE POLYISOPRENE POWDER-FREE SURGICAL GLOVES WITH EMOLLIENT COATING: Brand: PROTEXIS

## (undated) DEVICE — SUTURE PLN GUT SZ 2-0 L27IN ABSRB YELLOWISH TAN L70MM XLH 53T

## (undated) DEVICE — HANDLE SURG LIGHT OB

## (undated) DEVICE — APPLICATOR SCRB 26ML TEAL STRL -- CHLORAPREP 26ML

## (undated) DEVICE — SUT VCRL + 2-0 36IN CT1 UD --

## (undated) DEVICE — REM POLYHESIVE ADULT PATIENT RETURN ELECTRODE: Brand: VALLEYLAB

## (undated) DEVICE — BAG,SPONGE COUNTER,CLEAR,50/BX,5BX/CS: Brand: MEDLINE

## (undated) DEVICE — SUT MONOCRYL PLUS UD 4-0 --

## (undated) DEVICE — FORCEPS TISS L6.25IN S STL ADAIR STYL

## (undated) DEVICE — TRAY URIN CATH PED 16FR BLLN 5CC INDWL STR TIP INF CTRL